# Patient Record
Sex: FEMALE | Race: WHITE
[De-identification: names, ages, dates, MRNs, and addresses within clinical notes are randomized per-mention and may not be internally consistent; named-entity substitution may affect disease eponyms.]

---

## 2017-12-21 ENCOUNTER — HOSPITAL ENCOUNTER (OUTPATIENT)
Dept: HOSPITAL 89 - OR | Age: 52
Setting detail: OBSERVATION
LOS: 1 days | Discharge: HOME | End: 2017-12-22
Attending: SURGERY | Admitting: SURGERY
Payer: COMMERCIAL

## 2017-12-21 VITALS — SYSTOLIC BLOOD PRESSURE: 113 MMHG | DIASTOLIC BLOOD PRESSURE: 66 MMHG

## 2017-12-21 VITALS — SYSTOLIC BLOOD PRESSURE: 111 MMHG | DIASTOLIC BLOOD PRESSURE: 70 MMHG

## 2017-12-21 VITALS — SYSTOLIC BLOOD PRESSURE: 143 MMHG | DIASTOLIC BLOOD PRESSURE: 81 MMHG

## 2017-12-21 VITALS — SYSTOLIC BLOOD PRESSURE: 116 MMHG | DIASTOLIC BLOOD PRESSURE: 68 MMHG

## 2017-12-21 VITALS — HEIGHT: 65 IN | BODY MASS INDEX: 31.32 KG/M2 | WEIGHT: 188 LBS

## 2017-12-21 VITALS — SYSTOLIC BLOOD PRESSURE: 119 MMHG | DIASTOLIC BLOOD PRESSURE: 67 MMHG

## 2017-12-21 VITALS — SYSTOLIC BLOOD PRESSURE: 123 MMHG | DIASTOLIC BLOOD PRESSURE: 69 MMHG

## 2017-12-21 VITALS — DIASTOLIC BLOOD PRESSURE: 62 MMHG | SYSTOLIC BLOOD PRESSURE: 114 MMHG

## 2017-12-21 VITALS — SYSTOLIC BLOOD PRESSURE: 130 MMHG | DIASTOLIC BLOOD PRESSURE: 67 MMHG

## 2017-12-21 VITALS — SYSTOLIC BLOOD PRESSURE: 112 MMHG | DIASTOLIC BLOOD PRESSURE: 64 MMHG

## 2017-12-21 VITALS — SYSTOLIC BLOOD PRESSURE: 110 MMHG | DIASTOLIC BLOOD PRESSURE: 75 MMHG

## 2017-12-21 VITALS — SYSTOLIC BLOOD PRESSURE: 110 MMHG | DIASTOLIC BLOOD PRESSURE: 67 MMHG

## 2017-12-21 VITALS — SYSTOLIC BLOOD PRESSURE: 111 MMHG | DIASTOLIC BLOOD PRESSURE: 67 MMHG

## 2017-12-21 DIAGNOSIS — N83.201: Primary | ICD-10-CM

## 2017-12-21 DIAGNOSIS — K80.80: ICD-10-CM

## 2017-12-21 DIAGNOSIS — E11.9: ICD-10-CM

## 2017-12-21 DIAGNOSIS — K66.0: ICD-10-CM

## 2017-12-21 PROCEDURE — 49652: CPT

## 2017-12-21 PROCEDURE — 36416 COLLJ CAPILLARY BLOOD SPEC: CPT

## 2017-12-21 PROCEDURE — 82948 REAGENT STRIP/BLOOD GLUCOSE: CPT

## 2017-12-21 PROCEDURE — 88302 TISSUE EXAM BY PATHOLOGIST: CPT

## 2017-12-21 PROCEDURE — 94667 MNPJ CHEST WALL 1ST: CPT

## 2017-12-21 PROCEDURE — 44970 LAPAROSCOPY APPENDECTOMY: CPT

## 2017-12-21 PROCEDURE — 47563 LAPARO CHOLECYSTECTOMY/GRAPH: CPT

## 2017-12-21 PROCEDURE — 74300 X-RAY BILE DUCTS/PANCREAS: CPT

## 2017-12-21 PROCEDURE — 88304 TISSUE EXAM BY PATHOLOGIST: CPT

## 2017-12-21 PROCEDURE — 96372 THER/PROPH/DIAG INJ SC/IM: CPT

## 2017-12-21 PROCEDURE — 58661 LAPAROSCOPY REMOVE ADNEXA: CPT

## 2017-12-21 RX ADMIN — FAMOTIDINE SCH MG: 20 TABLET, FILM COATED ORAL at 21:24

## 2017-12-21 RX ADMIN — DOCUSATE SODIUM SCH MG: 100 CAPSULE, LIQUID FILLED ORAL at 21:24

## 2017-12-21 NOTE — RADIOLOGY IMAGING REPORT
FACILITY: [facility] - [place] 

 

PATIENT NAME: Yokasta Dowd

: 1965

MR: [id number]

V: [id number]

EXAM DATE: 

ORDERING PHYSICIAN: JOHN ULLRICH

TECHNOLOGIST: 

 

Location: [facility] - [place], [place]

Patient: Yokasta Dowd

: 1965

MRN: [id number]

Visit/Account:[id number]

Date of Sevice: 2017

 

ACCESSION #: [id number]

 

Exam type: CHOLANGIOGRAM OPERATIVE

 

History: CHOLECYSTITIS

 

Comparison: CT abdomen pelvis 2017.

 

Findings:

 

Contrast is seen in the common hepatic duct, bile duct right left hepatic ducts with free flow contra
st the duodenum.  A portion of the right left hepatic ducts are obscured by overlapping surgical inst
rument.  No intraluminal filling defects were identified.  There is no evidence of biliary obstructio
n.  There is free flow of contrast in the duodenum.  The total cumulative fluoroscopy dose is 0.99139
 mGray per meter squared

 

IMPRESSION:

 

1.  As above

 

Report Dictated By: Karmen Figueroa MD at 2017 1:32 PM

 

Report E-Signed By: Karmen Figueroa MD  at 2017 1:35 PM

 

WSN:[id number]

## 2017-12-21 NOTE — POST OPERATIVE PROGRESS NOTE
Post Operative Progress Note


Date:  Dec 21, 2017


Time:  15:18


Surgeon:  


Ullrich





Dictation number:  [id number] on Fluency Mobile


Anesthesia:  


GETA by Dr. Florentino


Pre-Op Diagnosis:  


1) Right lower quadrant cystic mass


2) Symptomatic gallstones


3) Ventral hernia


Post-Op Diagnosis:  


1) Right ovarian cystic mass


2) Symptomatic Gallstones


3) Adhesions


4) Ventral Hernia


Findings:  


Right ovary was full of cysts


Normal appearing appendix


Gallstones


Normal cholangiogram


Ventral hernia containing fat, 1.5cm fascial defect


Intraabdominal adhesions


Procedure(s):  


1) Robotic appendectomy


2) Robotic right oophorectomy


3) Robotic cholecystectomy


4) Robotic adhesiolysis


5) Robotic ventral hernia repair without mesh


Specimen Removed:(May be N/A):  


1) Right ovary


2) Appendix


3) Gallbladder and contents


Complications:  


None


Fluids:  


See anesthesia record


Estimated Blood Loss:  


Minimal


Date OP Note Dictated:  Dec 21, 2017


Time OP Note Dictated:  15:22











ULLRICH,JOHN A MD Dec 21, 2017 15:35

## 2017-12-22 VITALS — DIASTOLIC BLOOD PRESSURE: 72 MMHG | SYSTOLIC BLOOD PRESSURE: 118 MMHG

## 2017-12-22 RX ADMIN — FAMOTIDINE SCH MG: 20 TABLET, FILM COATED ORAL at 09:13

## 2017-12-22 RX ADMIN — DOCUSATE SODIUM SCH MG: 100 CAPSULE, LIQUID FILLED ORAL at 09:12

## 2017-12-24 NOTE — ULLRICH LAP CHOLE
EVENT DATE:  December 21, 2017

SURGEON: John Ullrich, MD

ANESTHESIOLOGIST: Manoj Florentino MD

ANESTHESIA: General Endotracheal 



PREOPERATIVE DIAGNOSIS  

1.  Right lower quadrant cystic mass.

2.  Symptomatic gallstones. 

3.  Ventral hernia. 



POSTOPERATIVE DIAGNOSIS 

1.  Right ovarian cystic mass.

2.  Symptomatic gallstones. 

3.  Intraabdominal adhesions. 

4.  Ventral hernia. 



PROCEDURE PERFORMED 

1.  Robotic appendectomy. 

2.  Robotic right oophorectomy. 

3.  Right robotic cholecystectomy.

4.  Robotic adhesiolysis.  

5.  Robotic ventral hernia repair without mesh.



COMPLICATIONS 

None.



CONDITION

Stable.



BLOOD LOSS 

Minimal.



FINDINGS

This patient's right ovary was almost completely taken over by cysts, and there 
was one cyst that was adherent to the right abdominal wall at about the level 
of the iliac crest that I think corresponds to what was seen on CT scan.  The 
gallbladder was not acutely grossly inflamed, but there was some chronic 
inflammation.  Cholangiogram was unremarkable.  She had intraabdominal 
adhesions of omentum to the anterior abdominal wall throughout most of the 
lower abdomen, thankfully mostly on the midline and to the left of midline.  
There was a small ventral hernia in the midline several centimeters above the 
umbilicus with about 1.5 cm fascial defect, and it was containing only fat.  



SPECIMENS 

1.  Right ovary.  

2.  Appendix.  

3.  Gallbladder and contents.





INDICATIONS

This is 52-year-old female who presented to the emergency room with abdominal 
pain, and workup which included a CT scan revealed gallstones as well as a 
cystic mass in the right lower quadrant that they thought might be associated 
with the tip of the appendix, but the ovary was not clearly visualized.  I saw 
her in the office, and consented her for appendectomy, possible oophorectomy on 
the right, and cholecystectomy as well as ventral hernia repair, which was also 
seen on the CT scan.  



DESCRIPTION OF PROCEDURE 

The patient was taken into the operating room and placed supine on the 
operating table.  General endotracheal anesthesia was administered and her 
abdomen was prepped and draped in a sterile fashion.  Time-out was completed, 
and I injected the inferior umbilical skin with 0.5% ropivacaine plain and then 
made a vertical incision in the area of her previous midline infraumbilical 
incision and dissected down through the dermis and subcutaneous fat.  I 
identified the midline fascia and made a vertical incision in the midline 
fascia.  I grasped the fascial edges with Kocher clamps and then entered the 
peritoneal cavity with my finger.  I placed 2 interrupted 0-Vicryl sutures 
transversely through the vertical fascial defect and inserted the 12 mm robotic 
Shira-type port through this wound, securing it into place with the sutures.  
I insufflated the abdomen to a pressure of 15 mmHg.  I inserted the robotic 
camera in through this port and inspected the abdominal cavity 
circumferentially.  There were adhesions in the inferior abdomen from the 
umbilicus down to the pelvis in the midline and to a lesser degree in the left 
lower quadrant.  Right lower quadrant and both upper quadrants were free of 
adhesions.  Next, under direct visualization, I placed an 8 mm robotic port in 
the left mid abdomen at about the lateral clavicular line, and then another one 
midway between this one and the umbilicus.  These were placed on a horizontal 
line level with the umbilicus.  I placed another 8 mm port in the right mid 
clavicular line level with the umbilicus as well.  Next, we docked the robot 
and inserted the instruments, and I directed the robot towards the lower 
abdominal wall and started with this portion of the operation.  I identified 
the appendix, which looked grossly normal, but I made a window in the 
mesoappendix adjacent to the base of the appendix, and then divided the base of 
the appendix with an Endo ARTHUR stapler with a blue load.  I then divided the 
mesoappendix with the vessel sealer, and then the appendix was placed in the 
right upper quadrant, waiting for the rest of the specimens.  I then turned my 
attention to the ovary, which looked very cystic, and there were cysts adherent 
to the lateral abdominal wall.  I used scissors to divide the peritoneum and 
start mobilizing the structures away from the lateral abdominal wall, and I 
used the energy device and divided the vascular pedicle to the ovary as well as 
the fallopian tube, and went all the way around the lateral structures until I 
freed up the ovary and  it completely from the abdominal wall and the 
fallopian tube.  This was done in a hemostatic fashion, and there really was no 
bleeding during this portion of the case.  The ovary and distal tube was then 
brought up to the right upper quadrant with the appendix.  I then flipped the 
robot around to the upper abdomen, and then used a ProGrasp to grasp the fundus 
of the gallbladder and retract it towards the patient's right shoulder.  I used 
a Cadiere grasper and grasped the infundibulum towards the patient's right hip.
  I used the hook electrocautery and divided the peritoneum overlying the 
infundibulum and up both the medial and lateral aspects of the gallbladder.  I 
then stripped the peritoneum and subperitoneal contents down off the 
infundibulum and identified the cystic duct and arteries.  The artery was 
clipped proximally and distally and divided between clips.  I clipped the duct 
at the infundibulum cystic duct junction, made a ductotomy just distal to the 
clip.  We then inserted an angiocatheter in the right upper quadrant and 
inserted a cholangiocatheter through the angiocatheter, and then I fed it into 
the cystic duct and then clipped it into place.  We then completed cholangiogram
, which revealed that I was in the cystic duct well away from the common duct 
structures, and I identified the common hepatic duct, intrahepatic biliary 
system, common bile duct and observed for contrast flowing into the duodenum.  
There were no filling defects, and the anatomy looked normal.   I removed the 
cholangiocatheter and then clipped the duct with three clips and then  divided 
the duct between the upper two most clips nearest the gallbladder.  I then 
divided the posterior attachments to the gallbladder,  from the 
gallbladder fossa, and then all three specimens were placed in a surgical 
specimen retrieval bag and removed from the abdomen through the umbilical port 
site.  I then turned my attention to the small fascial defect in the epigastric 
midline, and I reduced all preperitoneal fat that was going through the fascial 
defect, identified the fascial edges, and then closed the fascial edges with a V
-Loc absorbable suture.  I did not use any mesh because of the dirty nature of 
the cholecystectomy and appendectomy portions of the case so as to prevent mesh 
infection.   I used a running V-Loc suture to close the fascial defect, and 
once this was completed, the fascial defect was well approximated.  I then 
removed all instruments, undocked the robot, and then removed all of the ports.
  The abdomen was desufflated, and I closed the midline fascia at the 
subumbilical incision with running 0-Vicryl suture, and then tied all three of 
these down with good reapproximation of the fascial edges.  Each skin incision 
was closed with 4-0 Monocryl subcuticular sutures.  Skin was cleaned,  dried 
and Steri-Strips were applied followed by sterile surgical dressings.  The 
patient was awakened and extubated in the operating room and transported to the 
recovery room in stable condition having tolerated the procedure without 
apparent problems.  

DALY

## 2018-02-13 ENCOUNTER — HOSPITAL ENCOUNTER (OUTPATIENT)
Dept: HOSPITAL 89 - LAB | Age: 53
End: 2018-02-13
Attending: INTERNAL MEDICINE
Payer: COMMERCIAL

## 2018-02-13 DIAGNOSIS — E11.65: Primary | ICD-10-CM

## 2018-02-13 LAB — LDLC SERPL-MCNC: 60 MG/DL

## 2018-02-13 PROCEDURE — 83718 ASSAY OF LIPOPROTEIN: CPT

## 2018-02-13 PROCEDURE — 84478 ASSAY OF TRIGLYCERIDES: CPT

## 2018-02-13 PROCEDURE — 82465 ASSAY BLD/SERUM CHOLESTEROL: CPT

## 2018-02-13 PROCEDURE — 83036 HEMOGLOBIN GLYCOSYLATED A1C: CPT

## 2018-02-13 PROCEDURE — 36415 COLL VENOUS BLD VENIPUNCTURE: CPT

## 2018-05-29 ENCOUNTER — HOSPITAL ENCOUNTER (OUTPATIENT)
Dept: HOSPITAL 89 - LAB | Age: 53
End: 2018-05-29
Attending: INTERNAL MEDICINE
Payer: COMMERCIAL

## 2018-05-29 DIAGNOSIS — E11.65: Primary | ICD-10-CM

## 2018-05-29 PROCEDURE — 83036 HEMOGLOBIN GLYCOSYLATED A1C: CPT

## 2018-05-29 PROCEDURE — 36415 COLL VENOUS BLD VENIPUNCTURE: CPT

## 2018-09-27 ENCOUNTER — HOSPITAL ENCOUNTER (OUTPATIENT)
Dept: HOSPITAL 89 - LAB | Age: 53
End: 2018-09-27
Attending: INTERNAL MEDICINE
Payer: COMMERCIAL

## 2018-09-27 DIAGNOSIS — E11.65: Primary | ICD-10-CM

## 2018-09-27 PROCEDURE — 36415 COLL VENOUS BLD VENIPUNCTURE: CPT

## 2018-09-27 PROCEDURE — 83036 HEMOGLOBIN GLYCOSYLATED A1C: CPT

## 2018-10-08 ENCOUNTER — HOSPITAL ENCOUNTER (OUTPATIENT)
Dept: HOSPITAL 89 - CT | Age: 53
End: 2018-10-08
Attending: INTERNAL MEDICINE
Payer: COMMERCIAL

## 2018-10-08 DIAGNOSIS — K44.9: Primary | ICD-10-CM

## 2018-10-08 DIAGNOSIS — Z90.49: ICD-10-CM

## 2018-10-08 DIAGNOSIS — K43.9: ICD-10-CM

## 2018-10-08 PROCEDURE — 74150 CT ABDOMEN W/O CONTRAST: CPT

## 2018-10-08 NOTE — RADIOLOGY IMAGING REPORT
FACILITY: [facility] - [place], [place] 

 

PATIENT NAME: Yokasta Dowd

: 1965

MR: [id number]

V: [id number]

EXAM DATE: 

ORDERING PHYSICIAN: JEN SAMPSON

TECHNOLOGIST: 

 

Location: [facility] - [place]

Patient: Yokasta Dowd

: 1965

MRN: [id number]

Visit/Account:[id number]

Date of Sevice: 10/08/2018

 

ACCESSION #: [id number]

 

CT ABDOMEN WITHOUT CONTRAST

 

CLINICAL INFORMATION:   Ventral hernia, pain and bulging

 

TECHNIQUE:   Axial CT images were obtained through the abdomen without administration of IV contrast.
 Reformatted coronal and sagittal images were also obtained. Dose Lowering Technique

 

One of the following dose optimization techniques was utilized in the performance of this exam: Autom
ated exposure control; adjustment of the mA and/or kV according to the patient's size; or use of an i
terative  reconstruction technique.  Specific details can be referenced in the facility's radiology C
T exam operational policy.

 

 

 

COMPARISON:   2017

 

FINDINGS:

Lower lung fields: Limited views lower lung field are unremarkable.

Evaluation of the solid organs of the abdomen is limited without IV contrast.

Liver: No focal parenchymal abnormality of the liver.

Biliary: Postsurgical changes from a cholecystectomy

Pancreas: Normal appearance.

Spleen: Normal appearance.

Adrenal glands: Unremarkable.

Kidneys / retroperitoneum: No evidence of nephrolithiasis or hydronephrosis

 

Bowel / peritoneum / mesenteries: There is a small hiatal hernia

Lymph node assessment: No pathologic adenopathy identified.

 

Vessels: No significant atherosclerotic calcification seen throughout a nonaneurysmal abdominal aorta
 and branches.

 

Musculoskeletal / Body wall: Again noted is a ventral hernia just above the level of the umbilicus.  
The hernia sac opening measures approximately 4.4 cm in diameter as opposed to 1.7 cm previously.  Th
e hernia contains edematous fat.  There are mild spondylotic changes of the visualized thoracolumbar 
spine.

 

 

IMPRESSION:

1.  There is a ventral hernia just above the umbilicus with the hernia opening measuring 4.4 centers 
in diameters opposed 1.7 cm previously.  The hernia sac contains edematous fat.

 

Small hiatal hernia

 

Postsurgical changes from a cholecystectomy

 

 

Report Dictated By: Karmen Figueroa MD at 10/8/2018 3:34 PM

 

Report E-Signed By: Karmen Figueroa MD  at 10/8/2018 3:41 PM

 

WSN:[id number]

## 2019-01-10 ENCOUNTER — HOSPITAL ENCOUNTER (OUTPATIENT)
Dept: HOSPITAL 89 - OR | Age: 54
Setting detail: OBSERVATION
LOS: 1 days | Discharge: HOME | End: 2019-01-11
Attending: SURGERY | Admitting: SURGERY
Payer: COMMERCIAL

## 2019-01-10 VITALS — DIASTOLIC BLOOD PRESSURE: 66 MMHG | SYSTOLIC BLOOD PRESSURE: 121 MMHG

## 2019-01-10 VITALS — DIASTOLIC BLOOD PRESSURE: 67 MMHG | SYSTOLIC BLOOD PRESSURE: 112 MMHG

## 2019-01-10 VITALS — SYSTOLIC BLOOD PRESSURE: 118 MMHG | DIASTOLIC BLOOD PRESSURE: 69 MMHG

## 2019-01-10 VITALS — SYSTOLIC BLOOD PRESSURE: 117 MMHG | DIASTOLIC BLOOD PRESSURE: 84 MMHG

## 2019-01-10 VITALS — SYSTOLIC BLOOD PRESSURE: 123 MMHG | DIASTOLIC BLOOD PRESSURE: 76 MMHG

## 2019-01-10 VITALS — DIASTOLIC BLOOD PRESSURE: 68 MMHG | SYSTOLIC BLOOD PRESSURE: 126 MMHG

## 2019-01-10 VITALS — SYSTOLIC BLOOD PRESSURE: 114 MMHG | DIASTOLIC BLOOD PRESSURE: 70 MMHG

## 2019-01-10 VITALS — DIASTOLIC BLOOD PRESSURE: 68 MMHG | SYSTOLIC BLOOD PRESSURE: 117 MMHG

## 2019-01-10 VITALS — DIASTOLIC BLOOD PRESSURE: 67 MMHG | SYSTOLIC BLOOD PRESSURE: 104 MMHG

## 2019-01-10 VITALS — DIASTOLIC BLOOD PRESSURE: 70 MMHG | SYSTOLIC BLOOD PRESSURE: 118 MMHG

## 2019-01-10 VITALS — SYSTOLIC BLOOD PRESSURE: 132 MMHG | DIASTOLIC BLOOD PRESSURE: 74 MMHG

## 2019-01-10 VITALS — HEIGHT: 65 IN | BODY MASS INDEX: 29.99 KG/M2 | WEIGHT: 180 LBS

## 2019-01-10 VITALS — SYSTOLIC BLOOD PRESSURE: 111 MMHG | DIASTOLIC BLOOD PRESSURE: 70 MMHG

## 2019-01-10 VITALS — DIASTOLIC BLOOD PRESSURE: 66 MMHG | SYSTOLIC BLOOD PRESSURE: 126 MMHG

## 2019-01-10 DIAGNOSIS — E78.00: ICD-10-CM

## 2019-01-10 DIAGNOSIS — K43.2: Primary | ICD-10-CM

## 2019-01-10 DIAGNOSIS — Z79.84: ICD-10-CM

## 2019-01-10 DIAGNOSIS — E11.9: ICD-10-CM

## 2019-01-10 DIAGNOSIS — K21.9: ICD-10-CM

## 2019-01-10 DIAGNOSIS — I10: ICD-10-CM

## 2019-01-10 PROCEDURE — 49652: CPT

## 2019-01-10 PROCEDURE — 36416 COLLJ CAPILLARY BLOOD SPEC: CPT

## 2019-01-10 PROCEDURE — 82948 REAGENT STRIP/BLOOD GLUCOSE: CPT

## 2019-01-10 PROCEDURE — 96372 THER/PROPH/DIAG INJ SC/IM: CPT

## 2019-01-10 RX ADMIN — INSULIN LISPRO PRN UNIT: 100 INJECTION, SOLUTION INTRAVENOUS; SUBCUTANEOUS at 21:09

## 2019-01-10 RX ADMIN — SODIUM CHLORIDE SCH MLS/HR: 900 IRRIGANT IRRIGATION at 19:25

## 2019-01-10 RX ADMIN — DOCUSATE SODIUM SCH MG: 100 CAPSULE, LIQUID FILLED ORAL at 21:10

## 2019-01-10 RX ADMIN — FAMOTIDINE SCH MG: 20 TABLET, FILM COATED ORAL at 21:10

## 2019-01-10 RX ADMIN — INSULIN LISPRO PRN UNIT: 100 INJECTION, SOLUTION INTRAVENOUS; SUBCUTANEOUS at 17:26

## 2019-01-10 RX ADMIN — SODIUM CHLORIDE SCH MLS/HR: 900 IRRIGANT IRRIGATION at 14:45

## 2019-01-10 NOTE — NUR
per patients on glucometer device

-------------------------------------------------------------------------------

Addendum: 01/10/19 at 2142 by TEODORA STAPLETON RN

-------------------------------------------------------------------------------

Amended: Links added.

## 2019-01-10 NOTE — POST OPERATIVE PROGRESS NOTE
Post Operative Progress Note


Date:  Augustus 10, 2019


Time:  10:03


Surgeon:  


Ullrich





Dictation number:  [id number]


Anesthesia:  


GETA by Dr. Stallings


Pre-Op Diagnosis:  


Recurrent ventral/incisional hernia


Post-Op Diagnosis:  


YOSVANY


Findings:  


C/W dx, 4x3cm fascial defect in epigastric midline


Procedure(s):  


Robotic ventral hernia repair with 8x12cm Ventrio mesh


Specimen Removed:(May be N/A):  


None


Complications:  


None


Fluids:  


See anesthsia record


Estimated Blood Loss:  


Minimal


Date OP Note Dictated:  Augustus 10, 2019


Time OP Note Dictated:  10:05











ULLRICH,JOHN A MD              Augustus 10, 2019 10:12

## 2019-01-10 NOTE — OPERATIVE REPORT 1
EVENT DATE: January 10, 2019 

SURGEON: John Ullrich, MD 

ANESTHESIOLOGIST: Bubba Stallings MD 

ANESTHESIA: General endotracheal anesthesia. 





PREOPERATIVE DIAGNOSIS  

Recurring ventral/incisional hernia. 



POSTOPERATIVE DIAGNOSIS 

Recurring ventral/incisional hernia. 



PROCEDURE PERFORMED 

Robotic ventral hernia repair with mesh, 8 x 12 cm piece of Ventral mesh used to

plug the defect.  



COMPLICATIONS 

None. 



CONDITION 

Stable.  



ESTIMATED BLOOD LOSS

Minimal. 



FINDINGS 

The patient had a 3 x 4 cm fascial defect in the epigastric midline above the 

umbilicus.  



INDICATIONS

This is a 53-year-old female who presented to my office with a hernia in her 

epigastric midline just above her umbilicus in an area of a previous laparotomy 

incision.  I actually repaired it without mesh last year when I was doing 

another surgery involving an appendectomy, cholecystectomy, and right 

oophorectomy, but because of the nature of that surgery, I could not use mesh 

and so told her about the risk of recurrence and it has since recurred and she 

is requesting to have it repaired.  



DESCRIPTION OF PROCEDURE 

The patient was brought to the operating room and placed supine on the operating

table.  General endotracheal anesthesia was administered and her abdomen was 

prepped and draped in sterile fashion.  A timeout was completed and I injected 

the right subcostal skin with 0.5% Ropivacaine plain and made an 8 mm transverse

incision in the right subcostal skin in about the mid clavicular line.  I then 

used the Veress needle and accessed the abdominal cavity and insufflated the 

abdominal cavity to a pressure of 15 mmHg without any problems.  I then used an 

8 mm Robotic port with the optical trocar and the camera in and focused and 

inserted the trocar in the insufflated abdomen under direct visualization 

without any problems.  I then under direct visualization placed a 12 mm robotic 

trocar in the right mid-abdomen, somewhat lateral about the anterior axillary 

line and then an 8 mm robotic port in the right lower quadrant.  I then brought 

the robot in, docked and targeted the robot, and then scrubbed out and went to 

the console and then reduced the hernia which was easily reduced back into the 

abdomen and then stripped the peritoneum down on all sides of the fascial defect

including the falciform ligament and there were also some adhesions that I had 

to take down.  After cleaning the fascial defect circumferentially and stripping

the peritoneum down off of the abdominal wall on all sides of the fascial defect

to make room for the mesh, I put the ruler inside the abdomen and measured the 

fascial defect and it was 4 cm in the transverse direction and 3 cm in the 

vertical direction.  I then closed the fascial defect with a running absorbable 

V-Loc suture and then placed an 8 x 12 cm piece of Ventral mesh in the abdominal

cavity and I placed a 2-0 Prolene suture right in the middle of the mesh on the 

Prolene side and then used a Mumtaz-Alma to pull the mesh up against the 

abdominal wall.  I then sewed the mesh all the way circumferentially with 

absorbable V-Loc sutures and then ran one time down the middle to make sure this

was adherent to the abdominal wall.  I then made sure I removed all of any 

suture fragments and needles from the abdominal cavity and then the robot was 

undocked after all the instruments were removed and then I used the Mumtaz-

Alma to close the 12 mm fascia with a single 0 Vicryl suture.  I did this 

under direct visualization and noticed good reapproximation of the fascial edges

when this was completed.  I then closed the skin in each port site with 4-0 

Monocryl subcuticular suture and the stab incision in the midline to pass the 

Mumtaz-Alma through was closed with a single 3-0 Chromic suture.  I then 

cleaned and dried the skin and placed Steri-Strips over each of the incisions 

followed by sterile surgical dressings.  The patient was awakened and extubated 

in the operating room and transferred to the recovery room in stable condition 

having tolerated the procedure without any apparent problems. 

DALY

## 2019-01-11 VITALS — SYSTOLIC BLOOD PRESSURE: 125 MMHG | DIASTOLIC BLOOD PRESSURE: 71 MMHG

## 2019-01-11 VITALS — DIASTOLIC BLOOD PRESSURE: 61 MMHG | SYSTOLIC BLOOD PRESSURE: 113 MMHG

## 2019-01-11 RX ADMIN — FAMOTIDINE SCH MG: 20 TABLET, FILM COATED ORAL at 09:43

## 2019-01-11 RX ADMIN — DOCUSATE SODIUM SCH MG: 100 CAPSULE, LIQUID FILLED ORAL at 09:43

## 2019-01-11 RX ADMIN — SODIUM CHLORIDE SCH MLS/HR: 900 IRRIGANT IRRIGATION at 08:00

## 2019-01-11 RX ADMIN — SODIUM CHLORIDE SCH MLS/HR: 900 IRRIGANT IRRIGATION at 01:26

## 2019-01-11 NOTE — SHORT(OUTPT) DISCHARGE SUMMARY
Discharge Summary


Reason for Hosp/Final Diag:  


(1) S/P recurrent ventral herniorrhaphy


Status:  Acute


Hospital Course & Plan:  Robotic VH repair completed without problems.  Pt with 


some postop nausea but now better this morning.  No other issues overnight.  


Will d/c to home today.





Departure


Discharge to:  Home, Self Care





Discharge Instructions


Home Meds


Active Scripts


Oxycodone/Acetaminophen (OXYCODONE/ACETAMINOPHEN 5MG/325 MG) 5 Mg/325 Mg Tab, 1 


TAB PO Q4H PRN for MODERATE PAIN, #30 TAB 0 Refills


   Prov:ULLRICH,JOHN A MD         1/11/19


Docusate Sodium (DOCUSATE SODIUM) 100 Mg Capsule, 1 CAP PO BID, #30 CAPSULE 0 


Refills


   Prov:ULLRICH,JOHN A MD         1/11/19


Lisinopril (LISINOPRIL) 20 Mg Tablet, 1 TAB PO QDAY, #90 TAB 3 Refills


   Prov:JEN SAMPSON MD         1/2/19


Needles, Insulin Disposable (PEN NEEDLES) 1 Each Dis.needle, EA MC DAILY, #3 3 


Refills


   Prov:JEN SAMPSON MD         11/1/18


Empagliflozin (Jardiance) 10 Mg Tablet, 1 TAB PO DAILY, #90 TAB 3 Refills


   Prov:JEN SAMPSON MD         10/29/18


Flash Glucose Sensor (Freestyle Kody Sensor) 1 Each Kit, EACH SUBQ DAILY, #1


   Prov:JEN SAMPSON MD         10/3/18


Flash Glucose Scanning [place] (Freestyle Kody [place]) 1 Each Each, EACH 


ASDIRECTED, #1


   Prov:JEN SAMPSON MD         10/3/18


Fluticasone Prop 50 Mcg Ns (FLONASE 50 MCG NS) 16 Gm Spray.susp, 2 SPRAYS NS 


QDAY, #1 BOT 3 Refills


   Prov:JEN SAMPSON MD         10/2/18


Dulaglutide (Trulicity) 1.5 Mg/0.5 Ml Pen.injctr, 1.5 MG SQ weekly, #4 MISC 11 


Refills


   Prov:JEN SAMPSON MD         8/8/18


Rosuvastatin Calcium (CRESTOR) 40 Mg Tablet, 1 TAB PO QDAY, #90 TAB 3 Refills


   Prov:JEN SAMPSON MD         3/21/18


Insulin Lispro 100 Un/Ml Pen (HUMALOG 3 ML PEN) 100 Unit/1 Ml Insuln.pen, 38 


UNIT SQ DAILY, #1 BOX 11 Refills


   10 units subq before breakfast. 28 units subq before dinner.


   Prov:JEN SAMPSON MD         3/19/18


Insulin Degludec (Tresiba Flextouch U-100) 100 Unit/Ml (3 Ml) Insuln.pen, 96 


UNITS SUBQ QAM, #2 BOX 11 Refills


   Prov:JEN SAMPSON MD         3/5/18


Metformin Hcl (METFORMIN HCL) 1,000 Mg Tablet, 1 TAB PO BID, #180 TAB 3 Refills


   Prov:JEN SAMPSON MD         12/12/17


Hydrochlorothiazide (HYDROCHLOROTHIAZIDE) 12.5 Mg Tablet, 1 TAB PO QDAY, #90 TAB


4 Refills


   Prov:JEN SAMPSON MD         11/30/17


Reported Medications


Cholecalciferol (Vitamin D3) (VITAMIN D-3) 2,000 Unit Capsule, 2000 UNIT PO 


QDAY, CAPSULE


   3/14/17


Aspirin (Aspirin) 81 Mg Tablet., 1 TAB PO QDAY, 0 Refills


   2/4/12


Follow up Referrals:  


General Surgery - 01/22/19 @ Surgery, General with ULLRICH,JOHN A MD You have a 


follow up appointment scheduled with Dr. Ullrich on Tuesday, 1/22/19, at 4:00pm.





Diet:  Diabetic


Activity:  No Heavy Lifting


Special Instructions:  


You may remove the white surgical dressings in Saturday, 1/12/19, then you


can shower.  After showering, leave the incisions open to air but leave


the steristrips in place until they fall off on their own.  Do not immerse


the incisions for 2 weeks.  Avoid any activity that uses your abdominal


muscles or involves straining or lifting more than 10 pounds for 6 weeks


after surgery.











ULLRICH,JOHN A MD              Jan 11, 2019 07:19

## 2019-01-25 RX ADMIN — VANCOMYCIN HYDROCHLORIDE ONE MLS/HR: 1 INJECTION, POWDER, LYOPHILIZED, FOR SOLUTION INTRAVENOUS at 13:00

## 2019-01-25 RX ADMIN — PIPERACILLIN AND TAZOBACTAM ONE MLS/HR: 4; .5 INJECTION, POWDER, LYOPHILIZED, FOR SOLUTION INTRAVENOUS; PARENTERAL at 13:00

## 2019-01-26 ENCOUNTER — HOSPITAL ENCOUNTER (EMERGENCY)
Dept: HOSPITAL 89 - ER | Age: 54
Discharge: ANOTHER HOSPITAL | End: 2019-01-26
Payer: COMMERCIAL

## 2019-01-26 ENCOUNTER — HOSPITAL ENCOUNTER (OUTPATIENT)
Dept: HOSPITAL 89 - AMB | Age: 54
End: 2019-01-26

## 2019-01-26 ENCOUNTER — HOSPITAL ENCOUNTER (OUTPATIENT)
Dept: HOSPITAL 89 - AMB | Age: 54
End: 2019-01-26
Payer: COMMERCIAL

## 2019-01-26 VITALS — DIASTOLIC BLOOD PRESSURE: 36 MMHG | SYSTOLIC BLOOD PRESSURE: 81 MMHG

## 2019-01-26 DIAGNOSIS — E87.2: ICD-10-CM

## 2019-01-26 DIAGNOSIS — J96.02: ICD-10-CM

## 2019-01-26 DIAGNOSIS — A41.9: ICD-10-CM

## 2019-01-26 DIAGNOSIS — J96.01: ICD-10-CM

## 2019-01-26 DIAGNOSIS — R06.82: ICD-10-CM

## 2019-01-26 DIAGNOSIS — R56.9: Primary | ICD-10-CM

## 2019-01-26 DIAGNOSIS — D68.9: ICD-10-CM

## 2019-01-26 DIAGNOSIS — R65.21: ICD-10-CM

## 2019-01-26 DIAGNOSIS — R09.02: ICD-10-CM

## 2019-01-26 DIAGNOSIS — R00.0: ICD-10-CM

## 2019-01-26 DIAGNOSIS — T68.XXXA: ICD-10-CM

## 2019-01-26 DIAGNOSIS — E27.49: Primary | ICD-10-CM

## 2019-01-26 DIAGNOSIS — Z02.9: Primary | ICD-10-CM

## 2019-01-26 DIAGNOSIS — R11.2: ICD-10-CM

## 2019-01-26 DIAGNOSIS — I46.9: ICD-10-CM

## 2019-01-26 LAB
INR PPP: (no result)
PLATELET COUNT, AUTOMATED: 101 K/UL (ref 150–450)
PLATELET COUNT, AUTOMATED: 146 K/UL (ref 150–450)
PLATELET COUNT, AUTOMATED: 259 K/UL (ref 150–450)
PLATELET COUNT, AUTOMATED: 78 K/UL (ref 150–450)

## 2019-01-26 PROCEDURE — 93005 ELECTROCARDIOGRAM TRACING: CPT

## 2019-01-26 PROCEDURE — 85379 FIBRIN DEGRADATION QUANT: CPT

## 2019-01-26 PROCEDURE — 84520 ASSAY OF UREA NITROGEN: CPT

## 2019-01-26 PROCEDURE — 86920 COMPATIBILITY TEST SPIN: CPT

## 2019-01-26 PROCEDURE — 96365 THER/PROPH/DIAG IV INF INIT: CPT

## 2019-01-26 PROCEDURE — 86850 RBC ANTIBODY SCREEN: CPT

## 2019-01-26 PROCEDURE — 84484 ASSAY OF TROPONIN QUANT: CPT

## 2019-01-26 PROCEDURE — 84075 ASSAY ALKALINE PHOSPHATASE: CPT

## 2019-01-26 PROCEDURE — 82374 ASSAY BLOOD CARBON DIOXIDE: CPT

## 2019-01-26 PROCEDURE — 74176 CT ABD & PELVIS W/O CONTRAST: CPT

## 2019-01-26 PROCEDURE — 36416 COLLJ CAPILLARY BLOOD SPEC: CPT

## 2019-01-26 PROCEDURE — 85025 COMPLETE CBC W/AUTO DIFF WBC: CPT

## 2019-01-26 PROCEDURE — 99285 EMERGENCY DEPT VISIT HI MDM: CPT

## 2019-01-26 PROCEDURE — 84450 TRANSFERASE (AST) (SGOT): CPT

## 2019-01-26 PROCEDURE — 71250 CT THORAX DX C-: CPT

## 2019-01-26 PROCEDURE — 96375 TX/PRO/DX INJ NEW DRUG ADDON: CPT

## 2019-01-26 PROCEDURE — 86900 BLOOD TYPING SEROLOGIC ABO: CPT

## 2019-01-26 PROCEDURE — 36600 WITHDRAWAL OF ARTERIAL BLOOD: CPT

## 2019-01-26 PROCEDURE — 36556 INSERT NON-TUNNEL CV CATH: CPT

## 2019-01-26 PROCEDURE — 82274 ASSAY TEST FOR BLOOD FECAL: CPT

## 2019-01-26 PROCEDURE — 96368 THER/DIAG CONCURRENT INF: CPT

## 2019-01-26 PROCEDURE — 82948 REAGENT STRIP/BLOOD GLUCOSE: CPT

## 2019-01-26 PROCEDURE — 84295 ASSAY OF SERUM SODIUM: CPT

## 2019-01-26 PROCEDURE — 86901 BLOOD TYPING SEROLOGIC RH(D): CPT

## 2019-01-26 PROCEDURE — 84155 ASSAY OF PROTEIN SERUM: CPT

## 2019-01-26 PROCEDURE — 82947 ASSAY GLUCOSE BLOOD QUANT: CPT

## 2019-01-26 PROCEDURE — 82330 ASSAY OF CALCIUM: CPT

## 2019-01-26 PROCEDURE — 87040 BLOOD CULTURE FOR BACTERIA: CPT

## 2019-01-26 PROCEDURE — 94002 VENT MGMT INPAT INIT DAY: CPT

## 2019-01-26 PROCEDURE — 82803 BLOOD GASES ANY COMBINATION: CPT

## 2019-01-26 PROCEDURE — 85730 THROMBOPLASTIN TIME PARTIAL: CPT

## 2019-01-26 PROCEDURE — 83605 ASSAY OF LACTIC ACID: CPT

## 2019-01-26 PROCEDURE — 85610 PROTHROMBIN TIME: CPT

## 2019-01-26 PROCEDURE — 82310 ASSAY OF CALCIUM: CPT

## 2019-01-26 PROCEDURE — 71045 X-RAY EXAM CHEST 1 VIEW: CPT

## 2019-01-26 PROCEDURE — 82435 ASSAY OF BLOOD CHLORIDE: CPT

## 2019-01-26 PROCEDURE — 82247 BILIRUBIN TOTAL: CPT

## 2019-01-26 PROCEDURE — 82565 ASSAY OF CREATININE: CPT

## 2019-01-26 PROCEDURE — 31500 INSERT EMERGENCY AIRWAY: CPT

## 2019-01-26 PROCEDURE — 82040 ASSAY OF SERUM ALBUMIN: CPT

## 2019-01-26 PROCEDURE — 84460 ALANINE AMINO (ALT) (SGPT): CPT

## 2019-01-26 PROCEDURE — 84132 ASSAY OF SERUM POTASSIUM: CPT

## 2019-01-26 RX ADMIN — PIPERACILLIN AND TAZOBACTAM ONE MLS/HR: 4; .5 INJECTION, POWDER, LYOPHILIZED, FOR SOLUTION INTRAVENOUS; PARENTERAL at 13:00

## 2019-01-26 RX ADMIN — VANCOMYCIN HYDROCHLORIDE ONE MLS/HR: 1 INJECTION, POWDER, LYOPHILIZED, FOR SOLUTION INTRAVENOUS at 13:00

## 2019-01-26 NOTE — GENERAL SURGERY CONSULTATION
History of Present Illness


Requesting Physician


Dr. Gunderson


Reason for Consult


Shock, s/p cardiac arrest


Chief Complaint


near syncope


History of Present Illness


This 53 year old female presented to [facility] with history of near syncope.  she was 


hypotensive and rapidly deteriorated with cardiac arrest requiring CPR x 5.  


Upon my arrival patient had just returned from CT and was on maximal 


vasopressors.  She was markedly acidotic, hypothermic, and coagulopathic.  


Review of CT scan with radiology was remarkable for left adrenal hemorrhage, 


sternal fracture and small amount of perihepatic fluid likely related to the 


mechanical CPR..  Patient is s/p robotic ventral incisional hernia repair 15 


days ago.  there is fluid in the hernia sac as would be expected. Blood cultures


had been sent, Vancomycin and Zosyn administered.





History


Unable To Obtain Past Medical:  Unable to Obtain/Update


Home Meds


Active Scripts


Oxycodone/Acetaminophen (OXYCODONE/ACETAMINOPHEN 5MG/325 MG) 5 Mg/325 Mg Tab, 1 


TAB PO Q4H PRN for MODERATE PAIN, #30 TAB 0 Refills


   Prov:ULLRICH,JOHN A MD         1/11/19


Docusate Sodium (DOCUSATE SODIUM) 100 Mg Capsule, 1 CAP PO BID, #30 CAPSULE 0 


Refills


   Prov:ULLRICH,JOHN A MD         1/11/19


Lisinopril (LISINOPRIL) 20 Mg Tablet, 1 TAB PO QDAY, #90 TAB 3 Refills


   Prov:JEN SAMPSON MD         1/2/19


Needles, Insulin Disposable (PEN NEEDLES) 1 Each Dis.needle, EA MC DAILY, #3 3 


Refills


   Prov:JEN SAMPSON MD         11/1/18


Empagliflozin (Jardiance) 10 Mg Tablet, 1 TAB PO DAILY, #90 TAB 3 Refills


   Prov:JEN SAMPSON MD         10/29/18


Flash Glucose Sensor (Freestyle Kody Sensor) 1 Each Kit, EACH SUBQ DAILY, #1


   Prov:JEN SAMPSON MD         10/3/18


Flash Glucose Scanning Hamilton (Freestyle Kody Hamilton) 1 Each Each, EACH 


ASDIRECTED, #1


   Prov:JEN SAMPSON MD         10/3/18


Fluticasone Prop 50 Mcg Ns (FLONASE 50 MCG NS) 16 Gm Spray.susp, 2 SPRAYS NS 


QDAY, #1 BOT 3 Refills


   Prov:JEN SAMPSON MD         10/2/18


Dulaglutide (Trulicity) 1.5 Mg/0.5 Ml Pen.injctr, 1.5 MG SQ weekly, #4 MISC 11 


Refills


   Prov:JEN SAMPSON MD         8/8/18


Rosuvastatin Calcium (CRESTOR) 40 Mg Tablet, 1 TAB PO QDAY, #90 TAB 3 Refills


   Prov:JEN SAMPSON MD         3/21/18


Insulin Lispro 100 Un/Ml Pen (HUMALOG 3 ML PEN) 100 Unit/1 Ml Insuln.pen, 38 


UNIT SQ DAILY, #1 BOX 11 Refills


   10 units subq before breakfast. 28 units subq before dinner.


   Prov:JEN SAMPSON MD         3/19/18


Insulin Degludec (Tresiba Flextouch U-100) 100 Unit/Ml (3 Ml) Insuln.pen, 96 


UNITS SUBQ QAM, #2 BOX 11 Refills


   Prov:JEN SAMPSON MD         3/5/18


Metformin Hcl (METFORMIN HCL) 1,000 Mg Tablet, 1 TAB PO BID, #180 TAB 3 Refills


   Prov:JEN SAMPSON MD         12/12/17


Hydrochlorothiazide (HYDROCHLOROTHIAZIDE) 12.5 Mg Tablet, 1 TAB PO QDAY, #90 TAB


4 Refills


   Prov:JEN SAMPSON MD         11/30/17


Reported Medications


Cholecalciferol (Vitamin D3) (VITAMIN D-3) 2,000 Unit Capsule, 2000 UNIT PO 


QDAY, CAPSULE


   3/14/17


Aspirin (Aspirin) 81 Mg Tablet.dr, 1 TAB PO QDAY, 0 Refills


   2/4/12


Allergies:  


Coded Allergies:  


     No Known Drug Allergies (Verified , 12/27/18)


Family History:  


FH: HTN (hypertension)


  MOTHER


  BROTHER OR SISTER


FH: diabetes mellitus


  MOTHER


FH: hypercholesterolemia


  BROTHER OR SISTER


FHx: heart failure


  BROTHER OR SISTER





Exam


Vital Signs





Vital Signs








  Date Time  Temp Pulse Resp B/P (MAP) Pulse Ox O2 Delivery O2 Flow Rate FiO2


 


1/26/19 16:00        100.0


 


1/26/19 15:10  102 34     


 


1/26/19 15:09    110/89 (96)    


 


1/26/19 14:55     55   


 


1/26/19 11:55       10.0 


 


1/26/19 11:44 97.4     Non-Rebreather  








General Appearance:  Other (on ventilator)


Neuro:  Other (does follow simple commands, moving all 4 extremities)


Cardiovascular:  Other (sinus tachycardia)


Chest:  Other (contusion abrasion over the lower sternum)


GI:  Other (distended, soft, quiet, incisions all healing without erythema or 


warmth.)


Extremities:  Other (hematomas and oozing from IV sites and needle sticks)


Integumentary:  Cyanosis





Medical Decision Making


Data Points


Result Diagram:  


1/26/19 1427                                                                    


           1/26/19 1530








Assessment and Plan


Problems:  


(1) Acidosis, metabolic


Status:  Acute


Assessment & Plan:  Secondary to shock state and s/p caridac arrest x 5.


Na HCO3 to raise pH over 7.1





(2) Shock


Status:  Acute


Assessment & Plan:  Septic shock,  etiology unclear though likely related to 


pneumonia versus Urosepsis.  Did have a recent E Coli UTI


No evidence this is hemorrhagic shock based on CT scans, Good contractility on 


FAST so dont think this is cardiogenic.





(3) Acute respiratory failure with hypoxia and hypercarbia


Status:  Acute


(4) Hypothermia


Status:  Acute


Assessment & Plan:  Rewarming with Red Hugger





(5) Coagulopathy


Assessment & Plan:  Likely multifactorial s/p arrest, dilutional (patient had 


received over 7000 ml crystalloid)


Will give FFP 4 units now.





(6) Cardiac arrest with successful resuscitation


Status:  Acute


Assessment & Plan:  Following commands


Sternal fracture and blund trauma related to mechanical CPR device.





(7) Septic shock


Status:  Acute


Assessment & Plan:  Etiology unclear though does have infiltrate in lung bases 


on CT and recent hx of E Coli UTI.


Has received Vancomycin and Zosyn.


Did receive Stress Steroid dose due to finding of adrenal hemorrhage.





(8) Adrenal hemorrhage


Status:  Acute


Assessment & Plan:  unclear if related to sepsis or traumatic from CPR.


Did receive stress dose steroids.





Central Venous Access


Medical Necessity for Access:  Hemodynamic Monitoring, IV Access, Medication 


Administration


Condition


Critical condition.  Dr. Gunderson has arranged for emergent transfer to [facility].


Critical Time Spent:  1st 30-74 Minutes





Venous Thromboembolism


VTE Risk


Patient's VTE Risk:  High





VTE Diagnostic Test


2 Days Prior to Admit:  No





Antithrombotics


Is Pt On Any Antithrombotics?:  No





Prophylaxis Tx Contraindicated


Pharmacological Contraindicati:  Low Platelet Count





Problem Qualifiers





(1) Hypothermia:  


Encounter type:  initial encounter  Qualified Codes:  T68.XXXA - Hypothermia, 


initial encounter








KOSTAS GAN MD               Jan 26, 2019 17:07

## 2019-01-26 NOTE — PROCEDURE NOTE
ART Line Procedure Note


Consent Signed:  No


ART Line Indication:  Hemodynamic Monitoring


ART Line Lumen:  Single


Line Procedure:  Chlorhexidine Prep, Sterile Drapes Applied, Sterile Dressing 


Applied


Line Position:  L Femoral


Complications:  None


Line Post Position:  Sutured, Confirmed Blood Return











KOSTAS GAN MD               Jan 26, 2019 17:09

## 2019-01-26 NOTE — RADIOLOGY IMAGING REPORT
FACILITY: [facility] 

 

PATIENT NAME: Yokasta Dowd

: 1965

MR: [id number]

V: [id number]

EXAM DATE: 

ORDERING PHYSICIAN: KENN PARADA

TECHNOLOGIST: 

 

Location: [facility]

Patient: Yokasta Dowd

: 1965

MRN: [id number]

Visit/Account:[id number]

Date of Sevice:  2019

 

ACCESSION #: [id number]

 

CHEST SINGLE AP

 

Additional pertinent History:   Chest pain

 

COMPARISON STUDIES:  none

 

FINDINGS:

Support lines and catheters: EKG wire leads. Tracheostomy cannula seen 4 cm from the iron.

 

Lungs and Pleura:  Diminished lung excursion and apical lordotic angle foreshortening chest. There ar
e bibasilar areas of atelectasis.

 

Heart and vasculature:  Hearts grossly normal.

 

Marla and Mediastinum:  Widened mediastinum. Aortic arch is obscured.

 

Bones and Chest wall:  Negative.

 

Upper Abdomen:  Negative.

 

IMPRESSION:

1. Bilateral basilar atelectasis.

2. Widening the mediastinum. This could be technical but mediastinal pathology is of a concern.. Ran
mmend CT scan of the chest for complete evaluation.

 

Report Dictated By: Reed Desai MD at 2019 2:07 PM

 

Report E-Signed By: Reed Desai MD  at 2019 2:14 PM

 

WSN:[id number]

## 2019-01-26 NOTE — ER REPORT
History and Physical


Time Seen By MD:  11:50


HPI/ROS


54 y/o female with a history of insulin dependent diabetes and HTN presents to 


the ED with diffuse abdominal pain and presyncope at home. [place] nauseous when 


she woke up this morning, but otherwise felt at her baseline. AT [employer] 


for breakfast. Denies vomiting or diarrhea. No melena. Had hernia repair Augustus. 


10, and has otherwise been well. Denies chest pain or SOB. No fever/chills. Anjali


kuldeep for a UTI approximately one month ago. No HA or meningismus. No seizure 


activity as stated in the chief complaint. History was consistent with pre-


syncope and not seizure.


Remainder of the 14 system rev:  Yes


Allergies:  


Coded Allergies:  


     No Known Drug Allergies (Verified , 12/27/18)


Home Meds


Active Scripts


Oxycodone/Acetaminophen (OXYCODONE/ACETAMINOPHEN 5MG/325 MG) 5 Mg/325 Mg Tab, 1 


TAB PO Q4H PRN for MODERATE PAIN, #30 TAB 0 Refills


   Prov:ULLRICH,JOHN A MD         1/11/19


Docusate Sodium (DOCUSATE SODIUM) 100 Mg Capsule, 1 CAP PO BID, #30 CAPSULE 0 


Refills


   Prov:ULLRICH,JOHN A MD         1/11/19


Lisinopril (LISINOPRIL) 20 Mg Tablet, 1 TAB PO QDAY, #90 TAB 3 Refills


   Prov:JEN SAMPSON MD         1/2/19


Needles, Insulin Disposable (PEN NEEDLES) 1 Each Dis.needle, EA MC DAILY, #3 3 


Refills


   Prov:JEN SAMPSON MD         11/1/18


Empagliflozin (Jardiance) 10 Mg Tablet, 1 TAB PO DAILY, #90 TAB 3 Refills


   Prov:JEN SAMPSON MD         10/29/18


Flash Glucose Sensor (Freestyle Kody Sensor) 1 Each Kit, EACH SUBQ DAILY, #1


   Prov:JEN SAMPSON MD         10/3/18


Flash Glucose Scanning [place] (Freestyle Kody [place]) 1 Each Each, EACH 


ASDIRECTED, #1


   Prov:JEN SAMPSON MD         10/3/18


Fluticasone Prop 50 Mcg Ns (FLONASE 50 MCG NS) 16 Gm Spray.susp, 2 SPRAYS NS 


QDAY, #1 BOT 3 Refills


   Prov:JEN SAMPSON MD         10/2/18


Dulaglutide (Trulicity) 1.5 Mg/0.5 Ml Pen.injctr, 1.5 MG SQ weekly, #4 MISC 11 


Refills


   Prov:JEN SAMPSON MD         8/8/18


Rosuvastatin Calcium (CRESTOR) 40 Mg Tablet, 1 TAB PO QDAY, #90 TAB 3 Refills


   Prov:JEN SAMPSON MD         3/21/18


Insulin Lispro 100 Un/Ml Pen (HUMALOG 3 ML PEN) 100 Unit/1 Ml Insuln.pen, 38 


UNIT SQ DAILY, #1 BOX 11 Refills


   10 units subq before breakfast. 28 units subq before dinner.


   Prov:JEN SAMPSON MD         3/19/18


Insulin Degludec (Tresiba Flextouch U-100) 100 Unit/Ml (3 Ml) Insuln.pen, 96 


UNITS SUBQ QAM, #2 BOX 11 Refills


   Prov:JEN SAMPSON MD         3/5/18


Metformin Hcl (METFORMIN HCL) 1,000 Mg Tablet, 1 TAB PO BID, #180 TAB 3 Refills


   Prov:JEN SAMPSON MD         12/12/17


Hydrochlorothiazide (HYDROCHLOROTHIAZIDE) 12.5 Mg Tablet, 1 TAB PO QDAY, #90 TAB


4 Refills


   Prov:JEN SAMPSON MD         11/30/17


Reported Medications


Cholecalciferol (Vitamin D3) (VITAMIN D-3) 2,000 Unit Capsule, 2000 UNIT PO 


QDAY, CAPSULE


   3/14/17


Aspirin (Aspirin) 81 Mg Tablet.dr, 1 TAB PO QDAY, 0 Refills


   2/4/12


Reviewed Nurses Notes:  Yes


Old Medical Records Reviewed:  Yes


Hx Smoking:  No


Smoking Status:  Never Smoker


Exposure to Second Hand Smoke?:  Yes (CHILDHOOD AND CURRENTLY)


Hx Substance Use Disorder:  No


Hx Alcohol Use:  No


Constitutional





Vital Sign - Last 24 Hours








 1/26/19 1/26/19 1/26/19 1/26/19





 11:44 11:49 11:52 11:55


 


Temp 97.4   


 


Pulse 117   


 


Resp 18   


 


B/P (MAP) 78/53 95/20 (45) 128/87 (101) 


 


Pulse Ox 85   


 


O2 Delivery Non-Rebreather   


 


O2 Flow Rate    10.0


 


    





 1/26/19 1/26/19 1/26/19 1/26/19





 12:00 12:02 12:12 12:13


 


Resp    25


 


B/P (MAP) 78/53 (61) 82/66 (71) 89/62 (71) 


 


Pulse Ox    74





 1/26/19 1/26/19 1/26/19 1/26/19





 12:15 12:18 12:23 12:24


 


Pulse  118 118 


 


Resp  13 24 


 


B/P (MAP) 93/63 (73)   114/91 (99)





 1/26/19 1/26/19 1/26/19 1/26/19





 12:26 12:28 12:30 12:32


 


Pulse  115  


 


Resp  19  


 


B/P (MAP) 98/49 (65) 118/69 (85) 84/50 (61) 77/56 (63)


 


Pulse Ox  86  





 1/26/19 1/26/19 1/26/19 1/26/19





 12:33 12:34 12:36 12:38


 


Pulse 114   118


 


Resp 22   32


 


B/P (MAP)  79/50 (60) 45/24 (31) 61/--- (34)


 


Pulse Ox 94   92





 1/26/19 1/26/19 1/26/19 1/26/19





 12:41 12:43 12:44 12:45


 


Pulse  108  


 


Resp  31  


 


B/P (MAP) 45/24 (31)  39/23 (28) 49/25 (33)


 


Pulse Ox  83  





 1/26/19 1/26/19 1/26/19 1/26/19





 12:48 12:49 12:51 12:53


 


Pulse 112   115


 


Resp 38   32


 


B/P (MAP) 44/24 (31) 40/27 (31) 61/21 (34) 60/26 (37)


 


Pulse Ox 82   85





 1/26/19 1/26/19 1/26/19 1/26/19





 12:56 12:58 13:00 13:02


 


Pulse  114  


 


Resp  30  


 


B/P (MAP) 81/25 (43) 91/22 (45) 72/25 (41) 70/--- (496)


 


Pulse Ox  87  





 1/26/19 1/26/19 1/26/19 1/26/19





 13:03 13:04 13:06 13:08


 


Pulse 115   115


 


Resp 27   35


 


B/P (MAP)  51/27 (35) 104/76 (85) 61/28 (39)


 


Pulse Ox 70   86





 1/26/19 1/26/19 1/26/19 1/26/19





 13:10 13:13 13:14 13:16


 


Pulse  117  


 


Resp  40  


 


B/P (MAP) 48/24 (32)  55/26 (36) 61/28 (39)


 


Pulse Ox  77  





 1/26/19 1/26/19 1/26/19 1/26/19





 13:18 13:19 13:22 13:23


 


Pulse 123   ???


 


Resp 46   10


 


B/P (MAP)  106/--- (4563) 91/78 (82) 





 1/26/19 1/26/19 1/26/19 1/26/19





 13:24 13:26 13:28 13:30


 


Pulse   ??? 


 


Resp   72 


 


B/P (MAP) 50/31 (37) 53/--- (40) 160/116 (131) 146/113 (124)





 1/26/19 1/26/19 1/26/19 1/26/19





 13:32 13:33 13:34 13:36


 


Pulse  ???  


 


Resp  21  


 


B/P (MAP) 59/33 (42)  118/94 (102) 131/111 (118)





 1/26/19 1/26/19 1/26/19 1/26/19





 13:38 13:40 13:42 13:43


 


Pulse 90   ???


 


Resp 19   101


 


B/P (MAP) 43/23 (30) 81/22 (41) +++/214 (-40) 





 1/26/19 1/26/19 1/26/19 1/26/19





 13:46 13:47 13:48 13:49


 


Pulse   115 


 


Resp   19 


 


B/P (MAP) 43/33 (36)   58/25 (36)


 


FiO2  100.0  





 1/26/19 1/26/19 1/26/19 1/26/19





 13:50 13:52 13:53 13:55


 


Pulse   65 


 


Resp   20 


 


B/P (MAP) 66/43 (51) 105/35 (58)  116/55 (75)





 1/26/19 1/26/19 1/26/19 1/26/19





 13:57 13:58 13:59 14:01


 


Pulse  113  


 


Resp  30  


 


B/P (MAP) 167/63 (97)  172/136 (148) 159/95 (116)





 1/26/19 1/26/19 1/26/19 1/26/19





 14:03 14:04 14:05 14:08


 


Pulse 116   111


 


Resp 32   34


 


B/P (MAP) 112/88 (96) ???/??? (3055) 198/189 (192) 


 


Pulse Ox 55   ???





 1/26/19 1/26/19 1/26/19 1/26/19





 14:10 14:13 14:15 14:18


 


Pulse  44  56


 


Resp  15  101


 


B/P (MAP) 221/216 (218)  148/131 (137) 


 


Pulse Ox  ???  ???





 1/26/19 1/26/19 1/26/19 1/26/19





 14:30 14:32 14:34 14:35


 


Pulse 108   106


 


Resp 29   30


 


B/P (MAP) 46/27 (33) 79/--- (48) 116/52 (73) 


 


Pulse Ox 65   62





 1/26/19 1/26/19 1/26/19 1/26/19





 14:38 14:40 14:42 14:44


 


Pulse  103  


 


Resp  31  


 


B/P (MAP) 46/27 (33) 130/88 (102) 115/99 (104) 106/95 (99)


 


Pulse Ox  60  





 1/26/19 1/26/19 1/26/19 1/26/19





 14:45 14:46 14:48 14:50


 


Pulse 103   105


 


Resp 32   33


 


B/P (MAP)  125/65 (85) 43/--- (36) 108/86 (93)


 


Pulse Ox 62   55





 1/26/19 1/26/19 1/26/19 1/26/19





 14:52 14:54 14:55 14:58


 


Pulse   103 


 


Resp   32 


 


B/P (MAP) 95/82 (86) 51/21 (31) 39/20 (26) 58/21 (33)


 


Pulse Ox   55 





 1/26/19 1/26/19 1/26/19 1/26/19





 15:00 15:01 15:02 15:05


 


Pulse 102   102


 


Resp 33   34


 


B/P (MAP) 92/--- (93) 57/--- (41) 109/81 (90) ???/??? (3211)





 1/26/19 1/26/19 1/26/19 





 15:09 15:10 16:00 


 


Pulse  102  


 


Resp  34  


 


B/P (MAP) 110/89 (96)   


 


FiO2   100.0 








Physical Exam


 General Appearance: The patient is alert, has no immediate need for airway 


protection 


ENT, Mouth: Mucous membranes are dry


Respiratory: There are no retractions, lungs are clear to auscultation.


Cardiovascular: tachcardic and regular rhythm. 


Gastrointestinal:  Abdomen is soft with well healed surgical scars. Diffuse, non


focal TTP


Neurological: GCS 15, strength and sensation grossly in tact


Skin: cold and dry, mottled


Neck is supple non tender.


Extremities are nontender, nonswollen and have full range of motion.





DIFFERENTIAL DIAGNOSIS: After history and physical exam differential diagnosis 


was considered for abdominal pain including but not limited to appendicitis, 


cholecystitis, gastritis and urinary tract infection.





Medical Decision Making


Data Points


Result Diagram:  


1/26/19 1427                                                                    


           1/26/19 1530





Laboratory





Hematology








Test


 1/26/19


12:25 1/26/19


12:33 1/26/19


14:27 1/26/19


14:39


 


Sodium Level


 134 mmol/L


(137-145) 


 


 





 


Potassium Level


 4.6 mmol/L


(3.5-5.0) 


 


 





 


Chloride Level


 105 mmol/L


() 


 


 





 


Carbon Dioxide Level


 18 mmol/L


(22-31) 


 


 





 


Blood Urea Nitrogen


 34 mg/dl


(7-18) 


 


 





 


Creatinine


 2.10 mg/dl


(0.52-1.04) 


 


 





 


Glomerular Filtration Rate


Calc 24.5 


 


 


 





 


Calcium Level


 8.5 mg/dl


(8.4-10.2) 


 


 





 


Total Bilirubin


 0.9 mg/dl


(0.2-1.3) 


 


 





 


Aspartate Amino Transf


(AST/SGOT) 34 U/L (0-35) 


 


 


 





 


Alanine Aminotransferase


(ALT/SGPT) 30 U/L (0-56) 


 


 


 





 


Alkaline Phosphatase 65 U/L (0-126)    


 


Troponin I 0.199 ng/ml    


 


Total Protein


 6.7 g/dl


(6.3-8.2) 


 


 





 


Albumin


 3.4 g/dl


(3.5-5.0) 


 


 





 


D-Dimer Quantitative (PE/DVT)


 


 19.39 ug/ml


(0-0.50) 


 





 


Stool Occult Blood (IFOB)


 


 Negative


(NEGATIVE) 


 





 


Venous Blood pH


 


 7.27


(7.31-7.41) 


 





 


Venous Blood Partial Pressure


CO2 


 < 25 mmHg 


 


 





 


Venous Blood Partial Pressure


O2 


 60 mmHg 


 


 





 


Venous Blood HCO3  9 mmol/L   


 


Venous Blood Oxygen Saturation  94 %   


 


Venous Blood Base Excess  -19 mmol/L   


 


Lactate


 


 3.6 mmol/L


(0.7-2.1) 


 





 


Red Blood Count


 


 


 4.48 M/uL


(4.17-5.56) 





 


Mean Corpuscular Volume


 


 


 104.6 fL


(80.0-96.0) 





 


Mean Corpuscular Hemoglobin


 


 


 31.5 pg


(26.0-33.0) 





 


Mean Corpuscular Hemoglobin


Concent 


 


 30.1 g/dL


(32.0-36.0) 





 


Red Cell Distribution Width


 


 


 16.6 %


(11.5-14.5) 





 


Mean Platelet Volume


 


 


 8.3 fL


(7.2-11.1) 





 


Neutrophils (%) (Auto)


 


 


 56.4 %


(39.4-72.5) 





 


Lymphocytes (%) (Auto)


 


 


 34.8 %


(17.6-49.6) 





 


Monocytes (%) (Auto)


 


 


 7.7 %


(4.1-12.4) 





 


Eosinophils (%) (Auto)


 


 


 0.7 %


(0.4-6.7) 





 


Basophils (%) (Auto)


 


 


 0.4 %


(0.3-1.4) 





 


Nucleated RBC Relative Count


(auto) 


 


 0.3 /100WBC 


 





 


Neutrophils # (Auto)


 


 


 9.7 K/uL


(2.0-7.4) 





 


Lymphocytes # (Auto)


 


 


 6.0 K/uL


(1.3-3.6) 





 


Monocytes # (Auto)


 


 


 1.3 K/uL


(0.3-1.0) 





 


Eosinophils # (Auto)


 


 


 0.1 K/uL


(0.0-0.5) 





 


Basophils # (Auto)


 


 


 0.1 K/uL


(0.0-0.1) 





 


Nucleated RBC Absolute Count


(auto) 


 


 0.05 K/uL 


 





 


Peripheral Blood Smear   Yes Y/N  


 


Whole Blood Glucose


 


 


 


 223 mg/DL


()


 


Test


 1/26/19


15:30 1/26/19


15:31 


 





 


Random Glucose


 290 mg/dl


() 


 


 





 


Blood Gas Puncture Site  Line   


 


Blood Gas Patient Temperature  97.4 DEGREES   


 


Arterial Blood pH


 


 6.73


(7.35-7.45) 


 





 


Arterial Blood Partial


Pressure CO2 


 60 mmHg


(32-37) 


 





 


Arterial Blood Partial


Pressure O2 


 67 mmHg


(60-80) 


 





 


Arterial Blood HCO3


 


 8 mmol/L


(20-26) 


 





 


Arterial Blood Oxygen


Saturation 


 67 % () 


 


 





 


Arterial Blood Base Excess  -28.0 mmol/L   


 


Tuan Test  Nt avail   


 


Oxygen Liters/Minute  100%   








Chemistry








Test


 1/26/19


12:25 1/26/19


12:33 1/26/19


14:27 1/26/19


14:39


 


Glomerular Filtration Rate


Calc 24.5 


 


 


 





 


Calcium Level


 8.5 mg/dl


(8.4-10.2) 


 


 





 


Total Bilirubin


 0.9 mg/dl


(0.2-1.3) 


 


 





 


Aspartate Amino Transf


(AST/SGOT) 34 U/L (0-35) 


 


 


 





 


Alanine Aminotransferase


(ALT/SGPT) 30 U/L (0-56) 


 


 


 





 


Alkaline Phosphatase 65 U/L (0-126)    


 


Troponin I 0.199 ng/ml    


 


Total Protein


 6.7 g/dl


(6.3-8.2) 


 


 





 


Albumin


 3.4 g/dl


(3.5-5.0) 


 


 





 


D-Dimer Quantitative (PE/DVT)


 


 19.39 ug/ml


(0-0.50) 


 





 


Stool Occult Blood (IFOB)


 


 Negative


(NEGATIVE) 


 





 


Venous Blood pH


 


 7.27


(7.31-7.41) 


 





 


Venous Blood Partial Pressure


CO2 


 < 25 mmHg 


 


 





 


Venous Blood Partial Pressure


O2 


 60 mmHg 


 


 





 


Venous Blood HCO3  9 mmol/L   


 


Venous Blood Oxygen Saturation  94 %   


 


Venous Blood Base Excess  -19 mmol/L   


 


Lactate


 


 3.6 mmol/L


(0.7-2.1) 


 





 


White Blood Count


 


 


 17.1 k/uL


(4.5-11.0) 





 


Red Blood Count


 


 


 4.48 M/uL


(4.17-5.56) 





 


Hemoglobin


 


 


 14.1 g/dL


(12.0-16.0) 





 


Hematocrit


 


 


 46.8 %


(34.0-47.0) 





 


Mean Corpuscular Volume


 


 


 104.6 fL


(80.0-96.0) 





 


Mean Corpuscular Hemoglobin


 


 


 31.5 pg


(26.0-33.0) 





 


Mean Corpuscular Hemoglobin


Concent 


 


 30.1 g/dL


(32.0-36.0) 





 


Red Cell Distribution Width


 


 


 16.6 %


(11.5-14.5) 





 


Platelet Count


 


 


 78 K/uL


(150-450) 





 


Mean Platelet Volume


 


 


 8.3 fL


(7.2-11.1) 





 


Neutrophils (%) (Auto)


 


 


 56.4 %


(39.4-72.5) 





 


Lymphocytes (%) (Auto)


 


 


 34.8 %


(17.6-49.6) 





 


Monocytes (%) (Auto)


 


 


 7.7 %


(4.1-12.4) 





 


Eosinophils (%) (Auto)


 


 


 0.7 %


(0.4-6.7) 





 


Basophils (%) (Auto)


 


 


 0.4 %


(0.3-1.4) 





 


Nucleated RBC Relative Count


(auto) 


 


 0.3 /100WBC 


 





 


Neutrophils # (Auto)


 


 


 9.7 K/uL


(2.0-7.4) 





 


Lymphocytes # (Auto)


 


 


 6.0 K/uL


(1.3-3.6) 





 


Monocytes # (Auto)


 


 


 1.3 K/uL


(0.3-1.0) 





 


Eosinophils # (Auto)


 


 


 0.1 K/uL


(0.0-0.5) 





 


Basophils # (Auto)


 


 


 0.1 K/uL


(0.0-0.1) 





 


Nucleated RBC Absolute Count


(auto) 


 


 0.05 K/uL 


 





 


Peripheral Blood Smear   Yes Y/N  


 


Whole Blood Glucose


 


 


 


 223 mg/DL


()


 


Test


 1/26/19


15:30 1/26/19


15:31 


 





 


Blood Gas Puncture Site  Line   


 


Blood Gas Patient Temperature  97.4 DEGREES   


 


Arterial Blood pH


 


 6.73


(7.35-7.45) 


 





 


Arterial Blood Partial


Pressure CO2 


 60 mmHg


(32-37) 


 





 


Arterial Blood Partial


Pressure O2 


 67 mmHg


(60-80) 


 





 


Arterial Blood HCO3


 


 8 mmol/L


(20-26) 


 





 


Arterial Blood Oxygen


Saturation 


 67 % () 


 


 





 


Arterial Blood Base Excess  -28.0 mmol/L   


 


Tuan Test  Nt avail   


 


Oxygen Liters/Minute  100%   








Coagulation








Test


 1/26/19


12:33


 


D-Dimer Quantitative (PE/DVT) 19.39 ug/ml 











ED Course/Re-evaluation


ED Course


Nausea, diffuse abdominal pain, and 2 episodes of vomiting at home this morning.


Then 2 episodes of pre-syncope where she felt lightheaded and had to sit down 


for fear of having syncope. No chest pain or SOB. No fever/chills. Soft BP upon 


arrival, but quickly worsened. Skin mottled and worsening hypotension. 


Resuscitation started including emergent femoral triple lumen that was placed. 


Given abx and NS. At one point questionable positive FAST and drop in H/H 


(likely dilutional), so pt. given 2 units PRBCs. No evidence of right heart 


strain on bedside US. 5 episodes of initiation of CPR due to PEA. Multiple 


pressors started due to persistent hypotension. Intubated due to reps distress 


and worsening mental status. Able to obtain CT scan in between episodes of CPR. 


Dr. Monteiro called due to blood developed at the liver tip and persistent 


hypotension. CT scan shows adrenal hemorrhage likely from sepsis. Had multiple 


extensive conversations with family about risks/benefits of transfer while so 


critically ill. Family agrees to transfer. Accepted by critcal care team at [facility].


No urine or urine culture sent, b/c patient just recently making urine. Pt. with


elevated dimer likely from DIC.


Procedure





Procedure: Central line placement.





After verbal informed consent from patient; with the risks explained to be 


bleeding, infection.; minimal sterile barrier technique was used due to emergent


nature, gown, sterile gloves, hand washing and chlorhexidine prep.  The area 


anesthetized with 1% lidocaine.  The right femoral vein was punctured with a 19 


gauge finder needle, then a wire introducer was placed, a 7 [place] triple lumen 


was placed using Seldinger technique.  There were no complications.  Blood 


return low pressure, dark blood.  Patient tolerated procedure well. 


The procedure was performed by myself.














Procedure: Rapid sequence intubation.





Indication for the procedure was respiratory failure.  The patient was 


preoxygenated with 100% oxygen by face mask.  The patient was given the 


following IV medications: Etomidate, succinylcholine.  The patient was orally 


endotracheally intubated under direct visualization with a 7.5 ETT.    Tracheal 


intubation was confirmed with misting on the tube; breath sounds were 


auscultated equally bilaterally; appropriate color change with [place]


CO2 detector.  Chest X-ray shows ETT in good position.  The procedure was 


performed by myself.


Decision to Disposition Date:  Jan 26, 2019


Decision to Disposition Time:  17:02





Depart


Departure


Latest Vital Signs





Vital Signs








  Date Time  Temp Pulse Resp B/P (MAP) Pulse Ox O2 Delivery O2 Flow Rate FiO2


 


1/26/19 16:00        100.0


 


1/26/19 15:10  102 34     


 


1/26/19 15:09    110/89 (96)    


 


1/26/19 14:55     55   


 


1/26/19 11:55       10.0 


 


1/26/19 11:44 97.4     Non-Rebreather  








Impression:  


   Primary Impression:  


   Adrenal hemorrhage


   Additional Impressions:  


   Septic shock


   Cardiac arrest with successful resuscitation


   Hypothermia


   Acute respiratory failure with hypoxia and hypercarbia


   Acidosis, metabolic


   Coagulopathy


Condition:  Critical


Disposition:  XFER TO [facility]


Referrals:  


JEN SAMPSON MD (PCP)





Problem Qualifiers








   Additional Impressions:  


   Hypothermia


   Encounter type:  initial encounter  Qualified Codes:  T68.XXXA - Hypothermia,


   initial encounter








KENN PARADA MD                 Jan 26, 2019 11:51

## 2019-01-26 NOTE — EKG
FACILITY: [facility] - [place] 

 

PATIENT NAME: NÉSTOR MOORE

: [id number]

MR: [id number]

V: [id number]

EXAM DATE: 

ORDERING PHYSICIAN: KENN PARADA

TECHNOLOGIST: 

 

Test Reason : pre syncope

Blood Pressure : ***/*** mmHG

Vent. Rate : 121 BPM     Atrial Rate : 121 BPM

   P-R Int : 122 ms          QRS Dur : 094 ms

    QT Int : 344 ms       P-R-T Axes : 018 045 -38 degrees

   QTc Int : 488 ms

 

Sinus tachycardia

Nonspecific interventricular conduction delay

T wave inversion inferior leads

Nonspecific ST diffusely

Confirmed by CLYDE OQUENDO ([id number]) on 2019 9:13:47 PM

 

Referred By:             Confirmed By:CLYDE OQUENDO

## 2019-01-26 NOTE — RADIOLOGY IMAGING REPORT
FACILITY: [facility] - [place] 

 

PATIENT NAME: Yokasta Dowd

: 1965

MR: [id number]

V: [id number]

EXAM DATE: 

ORDERING PHYSICIAN: KENN PARADA

TECHNOLOGIST: 

 

Location: [facility] [place]

Patient: Yokasta Dowd

: 1965

MRN: [id number]

Visit/Account:[id number]

Date of Sevice:  2019

 

ACCESSION #: [id number]

 

EXAMINATION: CT chest, abdomen, and pelvis without IV contrast

 

HISTORY: Hypotensive, positive fast, code. Recent surgery.

 

TECHNIQUE:   Axial CT images of the chest, abdomen, and pelvis were obtained without IV contrast, wit
h coronal and sagittal 2D reconstructed images.

One of the following dose optimization techniques was utilized in the performance of this exam: Autom
ated exposure control; adjustment of the mA and/or kV according to the patient's size; or use of an i
terative  reconstruction technique.  Specific details can be referenced in the facility's radiology C
T exam operational policy.

 

COMPARISON:

CT abdomen 10/8/2018.

CT chest 2016.

 

FINDINGS:

 

Chest:

Lungs and pleura:  There is dense consolidation throughout the posterior aspect of the upper and lowe
r lobes which may represent a combination of pneumonitis, atelectasis, and/or aspiration. No pleural 
effusion or pneumothorax. Endotracheal tube tip 2.5 cm above the iron.

Mediastinum and otto:  Small amount of nonspecific fluid adjacent to the ascending thoracic aorta. Me
diastinal structures are otherwise unremarkable.

Heart, aorta, and great vessels:  Normal caliber thoracic aorta. Normal heart size. No pericardial ef
fusion. There is a small amount of scattered intravascular air present within the upper extremity vei
ns, right atrial appendage, right ventricle, and in the main pulmonary artery.

Chest lymph node assessment:  Negative.

 

Bones:  There is mild osseous irregularity along the mid body of the sternum. There is some mild mahi
on artifact at this level but this may represent a nondisplaced sternal fracture related to CPR. Trac
e amount of retrosternal hemorrhage. No definite rib fracture in the chest, allowing for mild motion 
artifact.

Chest wall:  Negative.

Lower neck:  Negative.

 

 

Abdomen/pelvis:

Liver:  Trace amount of perihepatic fluid. Normal hepatic size and morphology.

Gallbladder and bile ducts:  The gallbladder is surgically absent.

Spleen:  Negative.

Pancreas:  Normal pancreatic morphology. There is some retroperitoneal stranding adjacent to the panc
reas which is nonspecific but should be correlated with any clinical and/or laboratory evidence for p
ancreatitis.

Adrenal glands:  There is a new 2.7 cm hyperdense left adrenal mass, compatible with acute adrenal he
morrhage. The right adrenal gland is unremarkable.

Kidneys:  Normal size and morphology of both kidneys. There is nonspecific perinephric stranding surr
ounding both kidneys. No hydronephrosis or urinary calculi.

 

Bowel and peritoneum:  The small bowel and colon are normal in caliber. There is mild diffuse mesente
donta edema. Trace amount of free fluid in the pelvis. No free intraperitoneal air.

Pelvic  structures:  The urinary bladder is decompressed, with a Arguello catheter in place. Prior hys
terectomy.

Lymph node assessment:  Negative.

Vessels:  The abdominal aorta and IVC are normal in caliber. Mild vascular calcifications. Right femo
ral CVC in place with tip in the distal right common iliac vein. There is some mild intravascular air
 in the right iliac veins.

 

Musculoskeletal:   No acute osseous findings in the lumbar spine or bony pelvis.

Body wall:  New surgical changes along the anterior abdominal wall related to midline ventral hernia 
repair. There is fluid and a small amount of air within the potential space at the site of the prior 
abdominal wall hernia which can be a normal postoperative finding.

 

IMPRESSION:

1. Dense consolidation in the posterior aspect of the upper and lower lungs. This may represent a com
bination of pneumonia, aspiration, and/or atelectasis.

2. Small amount of nonspecific fluid adjacent to the ascending thoracic aorta.

3. Likely nondisplaced fractu e along the mid sternal body with a small amount of retrosternal hemorr
frances.

4. Acute left adrenal hemorrhage with focal hematoma measuring 2.7 cm.5. There is nonspecific retrope
ritoneal edema adjacent to the pancreas with perinephric stranding. This may partly relate to the lef
t adrenal hemorrhage. Correlate with any clinical and/or laboratory evidence for pancreatitis. Nonspe
cific perinephric stranding surrounds both kidneys.

6. New surgical changes of midline ventral hernia repair, with some fluid and a small amount of air w
ithin the potential space along the soft tissues of the anterior abdominal wall which can be a normal
 postoperative finding.

7. Trace amount of free fluid in the abdomen and pelvis.

8. Small bowel and colon are normal in caliber. Mild diffuse mesenteric edema.

 

Findings were discussed with Zeeshan Gomez MD at 2019 2:45 PM.

 

Report Dictated By: Kirk Mcguire MD at 2019 2:33 PM

 

Report E-Signed By: Kirk Mcguire MD  at 2019 3:07 PM

 

WSN:[id number]

## 2019-01-27 NOTE — EKG
FACILITY: [facility] - [place] 

 

PATIENT NAME: NÉSTOR MOORE

: [id number]

MR: [id number]

V: [id number]

EXAM DATE: 

ORDERING PHYSICIAN: KENN PARADA

TECHNOLOGIST: 

 

Test Reason : repeat

Blood Pressure : ***/*** mmHG

Vent. Rate : 119 BPM     Atrial Rate : 119 BPM

   P-R Int : 120 ms          QRS Dur : 090 ms

    QT Int : 360 ms       P-R-T Axes : 024 088 029 degrees

   QTc Int : 506 ms

 

Sinus tachycardia

Nonspecific intraventricular conduction delay.

Nonspecific ST abnormality

Abnormal ECG

When compared with ECG of 2019

No significant change was found

Confirmed by Austin Reyes ([id number]) on 2019 10:46:29 PM

 

Referred By:             Confirmed By:Austin Goss

## 2019-02-25 ENCOUNTER — HOSPITAL ENCOUNTER (OUTPATIENT)
Dept: HOSPITAL 89 - AMB | Age: 54
End: 2019-02-25
Payer: COMMERCIAL

## 2019-02-25 ENCOUNTER — HOSPITAL ENCOUNTER (INPATIENT)
Dept: HOSPITAL 89 - ER | Age: 54
LOS: 3 days | Discharge: HOME | DRG: 682 | End: 2019-02-28
Attending: FAMILY MEDICINE | Admitting: FAMILY MEDICINE
Payer: COMMERCIAL

## 2019-02-25 VITALS — DIASTOLIC BLOOD PRESSURE: 54 MMHG | SYSTOLIC BLOOD PRESSURE: 78 MMHG

## 2019-02-25 VITALS — SYSTOLIC BLOOD PRESSURE: 86 MMHG | DIASTOLIC BLOOD PRESSURE: 55 MMHG

## 2019-02-25 VITALS — DIASTOLIC BLOOD PRESSURE: 51 MMHG | SYSTOLIC BLOOD PRESSURE: 80 MMHG

## 2019-02-25 VITALS
HEIGHT: 65 IN | BODY MASS INDEX: 28.82 KG/M2 | HEIGHT: 65 IN | WEIGHT: 173 LBS | BODY MASS INDEX: 28.82 KG/M2 | WEIGHT: 173 LBS

## 2019-02-25 VITALS — DIASTOLIC BLOOD PRESSURE: 48 MMHG | SYSTOLIC BLOOD PRESSURE: 72 MMHG

## 2019-02-25 VITALS — DIASTOLIC BLOOD PRESSURE: 52 MMHG | SYSTOLIC BLOOD PRESSURE: 78 MMHG

## 2019-02-25 VITALS — SYSTOLIC BLOOD PRESSURE: 71 MMHG | DIASTOLIC BLOOD PRESSURE: 47 MMHG

## 2019-02-25 VITALS — DIASTOLIC BLOOD PRESSURE: 52 MMHG | SYSTOLIC BLOOD PRESSURE: 79 MMHG

## 2019-02-25 VITALS — DIASTOLIC BLOOD PRESSURE: 54 MMHG | SYSTOLIC BLOOD PRESSURE: 81 MMHG

## 2019-02-25 VITALS — BODY MASS INDEX: 28.79 KG/M2

## 2019-02-25 VITALS — SYSTOLIC BLOOD PRESSURE: 80 MMHG | DIASTOLIC BLOOD PRESSURE: 51 MMHG

## 2019-02-25 VITALS — DIASTOLIC BLOOD PRESSURE: 73 MMHG | SYSTOLIC BLOOD PRESSURE: 102 MMHG

## 2019-02-25 VITALS — DIASTOLIC BLOOD PRESSURE: 54 MMHG | SYSTOLIC BLOOD PRESSURE: 94 MMHG

## 2019-02-25 DIAGNOSIS — E11.9: ICD-10-CM

## 2019-02-25 DIAGNOSIS — Z79.4: ICD-10-CM

## 2019-02-25 DIAGNOSIS — Z86.711: ICD-10-CM

## 2019-02-25 DIAGNOSIS — R42: ICD-10-CM

## 2019-02-25 DIAGNOSIS — E87.5: ICD-10-CM

## 2019-02-25 DIAGNOSIS — I10: ICD-10-CM

## 2019-02-25 DIAGNOSIS — E27.49: ICD-10-CM

## 2019-02-25 DIAGNOSIS — Z79.01: ICD-10-CM

## 2019-02-25 DIAGNOSIS — J15.9: ICD-10-CM

## 2019-02-25 DIAGNOSIS — R53.1: Primary | ICD-10-CM

## 2019-02-25 DIAGNOSIS — I95.9: ICD-10-CM

## 2019-02-25 DIAGNOSIS — E66.9: ICD-10-CM

## 2019-02-25 DIAGNOSIS — E86.0: ICD-10-CM

## 2019-02-25 DIAGNOSIS — N17.9: Primary | ICD-10-CM

## 2019-02-25 LAB
INR PPP: 1.25
PLATELET COUNT, AUTOMATED: 271 K/UL (ref 150–450)

## 2019-02-25 PROCEDURE — 96375 TX/PRO/DX INJ NEW DRUG ADDON: CPT

## 2019-02-25 PROCEDURE — 99285 EMERGENCY DEPT VISIT HI MDM: CPT

## 2019-02-25 PROCEDURE — 84075 ASSAY ALKALINE PHOSPHATASE: CPT

## 2019-02-25 PROCEDURE — 82947 ASSAY GLUCOSE BLOOD QUANT: CPT

## 2019-02-25 PROCEDURE — 82948 REAGENT STRIP/BLOOD GLUCOSE: CPT

## 2019-02-25 PROCEDURE — 83690 ASSAY OF LIPASE: CPT

## 2019-02-25 PROCEDURE — 81001 URINALYSIS AUTO W/SCOPE: CPT

## 2019-02-25 PROCEDURE — 85025 COMPLETE CBC W/AUTO DIFF WBC: CPT

## 2019-02-25 PROCEDURE — 93976 VASCULAR STUDY: CPT

## 2019-02-25 PROCEDURE — 82565 ASSAY OF CREATININE: CPT

## 2019-02-25 PROCEDURE — 82310 ASSAY OF CALCIUM: CPT

## 2019-02-25 PROCEDURE — 82040 ASSAY OF SERUM ALBUMIN: CPT

## 2019-02-25 PROCEDURE — 96367 TX/PROPH/DG ADDL SEQ IV INF: CPT

## 2019-02-25 PROCEDURE — 71046 X-RAY EXAM CHEST 2 VIEWS: CPT

## 2019-02-25 PROCEDURE — 82374 ASSAY BLOOD CARBON DIOXIDE: CPT

## 2019-02-25 PROCEDURE — 74176 CT ABD & PELVIS W/O CONTRAST: CPT

## 2019-02-25 PROCEDURE — 83605 ASSAY OF LACTIC ACID: CPT

## 2019-02-25 PROCEDURE — 84155 ASSAY OF PROTEIN SERUM: CPT

## 2019-02-25 PROCEDURE — 85610 PROTHROMBIN TIME: CPT

## 2019-02-25 PROCEDURE — 93005 ELECTROCARDIOGRAM TRACING: CPT

## 2019-02-25 PROCEDURE — 36416 COLLJ CAPILLARY BLOOD SPEC: CPT

## 2019-02-25 PROCEDURE — 87040 BLOOD CULTURE FOR BACTERIA: CPT

## 2019-02-25 PROCEDURE — 96376 TX/PRO/DX INJ SAME DRUG ADON: CPT

## 2019-02-25 PROCEDURE — 84460 ALANINE AMINO (ALT) (SGPT): CPT

## 2019-02-25 PROCEDURE — 96361 HYDRATE IV INFUSION ADD-ON: CPT

## 2019-02-25 PROCEDURE — 96365 THER/PROPH/DIAG IV INF INIT: CPT

## 2019-02-25 PROCEDURE — 84450 TRANSFERASE (AST) (SGOT): CPT

## 2019-02-25 PROCEDURE — 06HM33Z INSERTION OF INFUSION DEVICE INTO RIGHT FEMORAL VEIN, PERCUTANEOUS APPROACH: ICD-10-PCS | Performed by: EMERGENCY MEDICINE

## 2019-02-25 PROCEDURE — 87502 INFLUENZA DNA AMP PROBE: CPT

## 2019-02-25 PROCEDURE — 84132 ASSAY OF SERUM POTASSIUM: CPT

## 2019-02-25 PROCEDURE — 82435 ASSAY OF BLOOD CHLORIDE: CPT

## 2019-02-25 PROCEDURE — 84520 ASSAY OF UREA NITROGEN: CPT

## 2019-02-25 PROCEDURE — 85730 THROMBOPLASTIN TIME PARTIAL: CPT

## 2019-02-25 PROCEDURE — 36415 COLL VENOUS BLD VENIPUNCTURE: CPT

## 2019-02-25 PROCEDURE — 84295 ASSAY OF SERUM SODIUM: CPT

## 2019-02-25 PROCEDURE — 84484 ASSAY OF TROPONIN QUANT: CPT

## 2019-02-25 PROCEDURE — 82247 BILIRUBIN TOTAL: CPT

## 2019-02-25 RX ADMIN — WARFARIN SODIUM SCH MG: 5 TABLET ORAL at 16:36

## 2019-02-25 RX ADMIN — THIAMINE HYDROCHLORIDE PRN MLS/HR: 100 INJECTION, SOLUTION INTRAMUSCULAR; INTRAVENOUS at 16:00

## 2019-02-25 RX ADMIN — INSULIN LISPRO PRN UNIT: 100 INJECTION, SOLUTION INTRAVENOUS; SUBCUTANEOUS at 21:56

## 2019-02-25 RX ADMIN — ENOXAPARIN SODIUM SCH MG: 100 INJECTION SUBCUTANEOUS at 21:26

## 2019-02-25 NOTE — RADIOLOGY IMAGING REPORT
FACILITY: [facility] 

 

PATIENT NAME: Yokasta Dowd

: 1965

MR: [id number]

V: [id number]

EXAM DATE: 

ORDERING PHYSICIAN: VEDA SUE

TECHNOLOGIST: 

 

Location: [facility] - [place]

Patient: Yokasta Dowd

: 1965

MRN: [id number]

Visit/Account:[id number]

Date of Sevice:  2019

 

ACCESSION #: [id number]

 

Exam type: CHEST PA LAT

 

History: FEVER

 

Comparison: 2019.

 

Findings:

 

There is subtle linear stranding in the left upper lobe not appreciated on the prior study.  Remainde
r the lung fields appear free of consolidation.  There is no evidence of pleural effusions.  The card
iac silhouette is normal in size.  The trachea is in midline.

 

IMPRESSION:

 

1.  Subtle linear stranding in the left upper lobe may represent a developing infiltrate or discoid a
telectasis.

 

Report Dictated By: Karmen Figueroa MD at 2019 11:48 AM

 

Report E-Signed By: Karmen Figueroa MD  at 2019 11:50 AM

 

WSN:[id number]

## 2019-02-25 NOTE — EKG
[place] [facility] [place] 

 

PATIENT NAME: NÉSTOR MOORE

: [id number]

MR: [id number]

V: [id number]

EXAM DATE: 

ORDERING PHYSICIAN: VEDA SUE

TECHNOLOGIST: 

 

Test Reason : HYPER K

Blood Pressure : ***/*** mmHG

Vent. Rate : 074 BPM     Atrial Rate : 074 BPM

   P-R Int : 142 ms          QRS Dur : 074 ms

    QT Int : 426 ms       P-R-T Axes : 019 -16 070 degrees

   QTc Int : 472 ms

 

Normal sinus rhythm

Normal ECG

When compared with ECG of 2019 13:08,

Vent. rate has decreased BY  45 BPM

T wave inversion no longer evident in Inferior leads

Nonspecific T wave abnormality no longer evident in Anterior leads

Confirmed by MARS DE LUNA ([id number]) on 2019 6:54:55 PM

 

Referred By:  VIKRAM           Confirmed By:MARS DE LUNA

## 2019-02-25 NOTE — ER REPORT
History and Physical


Time Seen By MD:  10:11


HPI/ROS


CHIEF COMPLAINT: Chills, general malaise, shortness of breath





HISTORY OF PRESENT ILLNESS: Patient is a 53-year-old female here with complaints


of fever, chills, general malaise, weakness, shortness of breath which started 


this morning. Patient complains of achiness, feeling increasingly fatigued 


starting this morning. Patient reportedly was septic recently and went into 


cardiac arrest while in the emergency department. Patient reports having similar


symptoms to her prior episode.





REVIEW OF SYSTEMS:


Constitutional: + fever, + chills, + aching


Eyes: No discharge.


ENT: No sore throat. 


Cardiovascular: No chest pain, no palpitations.


Respiratory: No cough, + shortness of breath.


Gastrointestinal: No abdominal pain, no vomiting.


Genitourinary: No hematuria.


Musculoskeletal: No back pain.


Skin: No rashes.


Neurological: No headache.


Allergies:  


Coded Allergies:  


     No Known Drug Allergies (Verified , 18)


Home Meds


Active Scripts


Warfarin Sodium (COUMADIN) 1 Mg Tablet, 0.5 MG PO QDAY, #30 TAB


   Prov:JEN SAMPSON MD         19


Insulin Lispro 100 Un/Ml Pen (HUMALOG 3 ML PEN) 100 Unit/1 Ml Insuln.pen, 2-4 


UNIT SQ QHS, #1 BOX 11 Refills


   Bedtime insulin Per sliding scale QHS


   BG =no insulin


   -250=2 units


   -300= 3 units


   BG >300= 4 units and call MD


   Prov:JEN SAMPSON MD         19


Lisinopril (LISINOPRIL) 20 Mg Tablet, 1 TAB PO QDAY, #90 TAB 3 Refills


   Prov:JEN SAMPSON MD         19


Needles, Insulin Disposable (PEN NEEDLES) 1 Each Dis.needle, EA MC DAILY, #3 3 


Refills


   Prov:JEN SAMPSON MD         18


Flash Glucose Sensor (Freestyle Kody Sensor) 1 Each Kit, EACH SUBQ DAILY, #1


   Prov:JEN SAMPSON MD         10/3/18


Flash Glucose Scanning [place] (Freestyle Kody [place]) 1 Each Each, EACH ASDIR


ECTED, #1


   Prov:JEN SAMPSON MD         10/3/18


Fluticasone Prop 50 Mcg Ns (FLONASE 50 MCG NS) 16 Gm Spray.susp, 2 SPRAYS NS 


QDAY, #1 BOT 3 Refills


   Prov:JEN SAMPSON MD         10/2/18


Reported Medications


Pantoprazole Sodium (PANTOPRAZOLE SODIUM) 40 Mg Tablet.dr, 40 MG PO QDAY, TAB.SR


   19


Warfarin Sodium (WARFARIN SODIUM) 4 Mg Tablet, 4 MG PO QDAY


   19


Potassium Chloride (KLOR-CON M20) 20 Meq Tab.er.prt, 20 MEQ PO TID


   19


Metoprolol Tartrate (METOPROLOL TARTRATE) 25 Mg Tablet, 1 TAB PO BID, TAB


   19


Insulin Lispro 100 Un/Ml Pen (HUMALOG 3 ML PEN) 100 Unit/1 Ml Insuln.pen, 2-12 


UNIT SQ TID, DIS.SYR


   1 unit per 15 grams of carbohydrates.


   Sliding scale for mealtime insulin:


   BG =no insulin


   -200= 2 units


   -250=4 units


   -300= 6 units


   -350= 8 units


   -400= 10 units


   BG >400= 12 units and call MD


   19


Furosemide (FUROSEMIDE) 40 Mg Tablet, 1 TAB PO QAM, TAB


   19


Insulin Glargine 100 Un/Ml Pen (LANTUS SOLOSTAR PEN) 100 Unit/1 Ml Insuln.pen, 


10 UNITS SQ QHS, PEN


   19


Cholecalciferol (Vitamin D3) (VITAMIN D-3) 2,000 Unit Capsule, 2000 UNIT PO 


QDAY, CAPSULE


   3/14/17


Discontinued Reported Medications


Aspirin (Aspirin) 81 Mg Tablet.dr, 1 TAB PO QDAY, 0 Refills


   12


Discontinued Scripts


Docusate Sodium (DOCUSATE SODIUM) 100 Mg Capsule, 1 CAP PO BID, #30 CAPSULE 0 


Refills


   Prov:ULLRICH,JOHN A MD         19


Oxycodone/Acetaminophen (OXYCODONE/ACETAMINOPHEN 5MG/325 MG) 5 Mg/325 Mg Tab, 1 


TAB PO Q4H PRN for MODERATE PAIN, #30 TAB 0 Refills


   Prov:ULLRICH,JOHN A MD         19


Empagliflozin (Jardiance) 10 Mg Tablet, 1 TAB PO DAILY, #90 TAB 3 Refills


   Prov:JEN SAMPSON MD         10/29/18


Dulaglutide (Trulicity) 1.5 Mg/0.5 Ml Pen.injctr, 1.5 MG SQ weekly, #4 MISC 11 


Refills


   Prov:JEN SAMPSON MD         18


Rosuvastatin Calcium (CRESTOR) 40 Mg Tablet, 1 TAB PO QDAY, #90 TAB 3 Refills


   Prov:JEN SAMPSON MD         3/21/18


Insulin Degludec (Tresiba Flextouch U-100) 100 Unit/Ml (3 Ml) Insuln.pen, 96 


UNITS SUBQ QAM, #2 BOX 11 Refills


   Prov:JEN SAMPSON MD         3/5/18


Metformin Hcl (METFORMIN HCL) 1,000 Mg Tablet, 1 TAB PO BID, #180 TAB 3 Refills


   Prov:JEN SAMPSON MD         17


Hydrochlorothiazide (HYDROCHLOROTHIAZIDE) 12.5 Mg Tablet, 1 TAB PO QDAY, #90 TAB


4 Refills


   Prov:JEN SAMPSON MD         17


Hx Smoking:  No


Smoking Status:  Never Smoker


Exposure to Second Hand Smoke?:  Yes (CHILDHOOD AND CURRENTLY)


Hx Substance Use Disorder:  No


Hx Alcohol Use:  No


Constitutional





Vital Sign - Last 24 Hours








 19





 10:09 10:11 10:14 10:22


 


Temp  97.8  


 


Pulse ??? 88  


 


Resp  16  


 


B/P (MAP)  116/76 116/76 (89) 


 


Pulse Ox  100  


 


O2 Delivery  Nasal Cannula  


 


O2 Flow Rate    2.0


 


    





 19





 10:24 10:39 10:54 11:09


 


Pulse 85 80 83 76


 


Resp  15 10 18


 


Pulse Ox  99  





 19





 11:11 11:16 11:34 11:36


 


Pulse  76  70


 


Resp  6  12


 


B/P (MAP) 113/42 (65)  110/59 (76) 


 


Pulse Ox  99  99





 19





 11:56 12:16 12:36 12:44


 


Pulse 66 80 75 


 


Resp 9 10 16 


 


B/P (MAP)    113/78 (90)


 


Pulse Ox 100 99 97 





 19





 12:56 13:00 13:05 13:20


 


Pulse 78  80 77


 


Resp 15  18 11


 


B/P (MAP)  116/65 (82)  


 


Pulse Ox 99  98 97





 19





 13:30 13:35 13:40 13:55


 


Pulse  75 78 76


 


Resp  13 10 8


 


B/P (MAP) 127/79 (95)   


 


Pulse Ox  96 97 100





 19 





 14:00 14:10 14:25 


 


Pulse  86 80 


 


Resp  15 9 


 


B/P (MAP) 127/88 (101)   


 


Pulse Ox  98 98 








Physical Exam


   General Appearance: The patient is alert, has no immediate need for airway 


protection and no signs of toxicity. Anxious appearing, uncomfortable


Eyes: Pupils equal and round no pallor or injection.


ENT, Mouth: Mucous membranes are moist.


Respiratory: There are no retractions, lungs are clear to auscultation.


Cardiovascular: Regular rate and rhythm. 


Gastrointestinal:  Abdomen is soft and non tender, no masses, bowel sounds 


normal.


Neurological: No focal deficits


Skin: Warm and dry, no rashes.


Musculoskeletal:  Neck is supple non tender.


      Extremities are nontender, nonswollen and have full range of motion.





DIFFERENTIAL DIAGNOSIS: After history and physical exam differential diagnosis 


was considered for adult fever including but not limited to viral syndromes 


including influenza, urinary tract infection, pneumonia and sepsis, electrolyte 


abnormality, dehydration





Medical Decision Making


Data Points


Result Diagram:  


19 1117                                                                    


           19 1349





Laboratory





Hematology








Test


 19


10:24 19


11:17 19


12:02 19


13:49


 


Influenza Virus Type A (PCR)


 Negative


(NEGATIVE) 


 


 





 


Influenza Virus Type B (PCR)


 Negative


(NEGATIVE) 


 


 





 


Red Blood Count


 


 3.71 M/uL


(4.17-5.56) 


 





 


Mean Corpuscular Volume


 


 97.3 fL


(80.0-96.0) 


 





 


Mean Corpuscular Hemoglobin


 


 31.9 pg


(26.0-33.0) 


 





 


Mean Corpuscular Hemoglobin


Concent 


 32.8 g/dL


(32.0-36.0) 


 





 


Red Cell Distribution Width


 


 16.0 %


(11.5-14.5) 


 





 


Mean Platelet Volume


 


 8.8 fL


(7.2-11.1) 


 





 


Neutrophils (%) (Auto)


 


 73.9 %


(39.4-72.5) 


 





 


Lymphocytes (%) (Auto)


 


 12.0 %


(17.6-49.6) 


 





 


Monocytes (%) (Auto)


 


 10.8 %


(4.1-12.4) 


 





 


Eosinophils (%) (Auto)


 


 2.5 %


(0.4-6.7) 


 





 


Basophils (%) (Auto)


 


 0.8 %


(0.3-1.4) 


 





 


Nucleated RBC Relative Count


(auto) 


 0.0 /100WBC 


 


 





 


Neutrophils # (Auto)


 


 7.2 K/uL


(2.0-7.4) 


 





 


Lymphocytes # (Auto)


 


 1.2 K/uL


(1.3-3.6) 


 





 


Monocytes # (Auto)


 


 1.1 K/uL


(0.3-1.0) 


 





 


Eosinophils # (Auto)


 


 0.2 K/uL


(0.0-0.5) 


 





 


Basophils # (Auto)


 


 0.1 K/uL


(0.0-0.1) 


 





 


Nucleated RBC Absolute Count


(auto) 


 0.00 K/uL 


 


 





 


Prothrombin Time


 


 15.8 seconds


(12.0-14.4) 


 





 


Prothromb Time International


Ratio 


 1.25 


 


 





 


Activated Partial


Thromboplast Time 


 25 seconds


(23-35) 


 





 


Troponin I  < 0.012 ng/ml   


 


Lipase


 


 459 U/L


() 


 





 


Urine Color   Yellow  


 


Urine Clarity


 


 


 Slightly-cloudy


 





 


Urine pH


 


 


 5.0 pH


(4.8-9.5) 





 


Urine Specific Gravity   1.008  


 


Urine Protein


 


 


 Negative mg/dL


(NEGATIVE) 





 


Urine Glucose (UA)


 


 


 Negative mg/dL


(NEGATIVE) 





 


Urine Ketones


 


 


 Negative mg/dL


(NEGATIVE) 





 


Urine Blood


 


 


 Small


(NEGATIVE) 





 


Urine Nitrite


 


 


 Negative


(NEGATIVE) 





 


Urine Bilirubin


 


 


 Negative


(NEGATIVE) 





 


Urine Urobilinogen


 


 


 Negative mg/dL


(0.2-1.9) 





 


Urine Leukocyte Esterase


 


 


 Negative


(NEGATIVE) 





 


Urine RBC


 


 


 None /HPF


(0-2/HPF) 





 


Urine WBC


 


 


 2 /HPF


(0-5/HPF) 





 


Urine Squamous Epithelial


Cells 


 


 Many /LPF


(</=FEW) 





 


Urine Bacteria


 


 


 Negative /HPF


(NONE-FEW) 





 


Urine Hyaline Casts


 


 


 Many /LPF


(NONE-FEW) 





 


Urine Mucus


 


 


 Few /HPF


(NONE-FEW) 





 


Sodium Level


 


 


 


 138 mmol/L


(137-145)


 


Potassium Level


 


 


 


 7.3 mmol/L


(3.5-5.0)


 


Chloride Level


 


 


 


 109 mmol/L


()


 


Carbon Dioxide Level


 


 


 


 20 mmol/L


(22-31)


 


Blood Urea Nitrogen


 


 


 


 33 mg/dl


(7-18)


 


Creatinine


 


 


 


 2.30 mg/dl


(0.52-1.04)


 


Glomerular Filtration Rate


Calc 


 


 


 22.1 





 


Random Glucose


 


 


 


 205 mg/dl


()


 


Lactate


 


 


 


 1.9 mmol/L


(0.7-2.1)


 


Calcium Level


 


 


 


 9.6 mg/dl


(8.4-10.2)


 


Total Bilirubin


 


 


 


 0.6 mg/dl


(0.2-1.3)


 


Aspartate Amino Transf


(AST/SGOT) 


 


 


 20 U/L (0-35) 





 


Alanine Aminotransferase


(ALT/SGPT) 


 


 


 18 U/L (0-56) 





 


Alkaline Phosphatase    91 U/L (0-126) 


 


Total Protein


 


 


 


 8.4 g/dl


(6.3-8.2)


 


Albumin


 


 


 


 4.6 g/dl


(3.5-5.0)








Chemistry








Test


 19


10:24 19


11:17 19


12:02 19


13:49


 


Influenza Virus Type A (PCR)


 Negative


(NEGATIVE) 


 


 





 


Influenza Virus Type B (PCR)


 Negative


(NEGATIVE) 


 


 





 


White Blood Count


 


 9.7 k/uL


(4.5-11.0) 


 





 


Red Blood Count


 


 3.71 M/uL


(4.17-5.56) 


 





 


Hemoglobin


 


 11.8 g/dL


(12.0-16.0) 


 





 


Hematocrit


 


 36.1 %


(34.0-47.0) 


 





 


Mean Corpuscular Volume


 


 97.3 fL


(80.0-96.0) 


 





 


Mean Corpuscular Hemoglobin


 


 31.9 pg


(26.0-33.0) 


 





 


Mean Corpuscular Hemoglobin


Concent 


 32.8 g/dL


(32.0-36.0) 


 





 


Red Cell Distribution Width


 


 16.0 %


(11.5-14.5) 


 





 


Platelet Count


 


 271 K/uL


(150-450) 


 





 


Mean Platelet Volume


 


 8.8 fL


(7.2-11.1) 


 





 


Neutrophils (%) (Auto)


 


 73.9 %


(39.4-72.5) 


 





 


Lymphocytes (%) (Auto)


 


 12.0 %


(17.6-49.6) 


 





 


Monocytes (%) (Auto)


 


 10.8 %


(4.1-12.4) 


 





 


Eosinophils (%) (Auto)


 


 2.5 %


(0.4-6.7) 


 





 


Basophils (%) (Auto)


 


 0.8 %


(0.3-1.4) 


 





 


Nucleated RBC Relative Count


(auto) 


 0.0 /100WBC 


 


 





 


Neutrophils # (Auto)


 


 7.2 K/uL


(2.0-7.4) 


 





 


Lymphocytes # (Auto)


 


 1.2 K/uL


(1.3-3.6) 


 





 


Monocytes # (Auto)


 


 1.1 K/uL


(0.3-1.0) 


 





 


Eosinophils # (Auto)


 


 0.2 K/uL


(0.0-0.5) 


 





 


Basophils # (Auto)


 


 0.1 K/uL


(0.0-0.1) 


 





 


Nucleated RBC Absolute Count


(auto) 


 0.00 K/uL 


 


 





 


Prothrombin Time


 


 15.8 seconds


(12.0-14.4) 


 





 


Prothromb Time International


Ratio 


 1.25 


 


 





 


Activated Partial


Thromboplast Time 


 25 seconds


(23-35) 


 





 


Troponin I  < 0.012 ng/ml   


 


Lipase


 


 459 U/L


() 


 





 


Urine Color   Yellow  


 


Urine Clarity


 


 


 Slightly-cloudy


 





 


Urine pH


 


 


 5.0 pH


(4.8-9.5) 





 


Urine Specific Gravity   1.008  


 


Urine Protein


 


 


 Negative mg/dL


(NEGATIVE) 





 


Urine Glucose (UA)


 


 


 Negative mg/dL


(NEGATIVE) 





 


Urine Ketones


 


 


 Negative mg/dL


(NEGATIVE) 





 


Urine Blood


 


 


 Small


(NEGATIVE) 





 


Urine Nitrite


 


 


 Negative


(NEGATIVE) 





 


Urine Bilirubin


 


 


 Negative


(NEGATIVE) 





 


Urine Urobilinogen


 


 


 Negative mg/dL


(0.2-1.9) 





 


Urine Leukocyte Esterase


 


 


 Negative


(NEGATIVE) 





 


Urine RBC


 


 


 None /HPF


(0-2/HPF) 





 


Urine WBC


 


 


 2 /HPF


(0-5/HPF) 





 


Urine Squamous Epithelial


Cells 


 


 Many /LPF


(</=FEW) 





 


Urine Bacteria


 


 


 Negative /HPF


(NONE-FEW) 





 


Urine Hyaline Casts


 


 


 Many /LPF


(NONE-FEW) 





 


Urine Mucus


 


 


 Few /HPF


(NONE-FEW) 





 


Glomerular Filtration Rate


Calc 


 


 


 22.1 





 


Lactate


 


 


 


 1.9 mmol/L


(0.7-2.1)


 


Calcium Level


 


 


 


 9.6 mg/dl


(8.4-10.2)


 


Total Bilirubin


 


 


 


 0.6 mg/dl


(0.2-1.3)


 


Aspartate Amino Transf


(AST/SGOT) 


 


 


 20 U/L (0-35) 





 


Alanine Aminotransferase


(ALT/SGPT) 


 


 


 18 U/L (0-56) 





 


Alkaline Phosphatase    91 U/L (0-126) 


 


Total Protein


 


 


 


 8.4 g/dl


(6.3-8.2)


 


Albumin


 


 


 


 4.6 g/dl


(3.5-5.0)








Coagulation








Test


 19


11:17


 


Prothrombin Time 15.8 seconds 


 


Prothromb Time International


Ratio 1.25 





 


Activated Partial


Thromboplast Time 25 seconds 











Urinalysis








Test


 19


12:02


 


Urine Color Yellow 


 


Urine Clarity


 Slightly-cloudy





 


Urine pH


 5.0 pH


(4.8-9.5)


 


Urine Specific Gravity 1.008 


 


Urine Protein


 Negative mg/dL


(NEGATIVE)


 


Urine Glucose (UA)


 Negative mg/dL


(NEGATIVE)


 


Urine Ketones


 Negative mg/dL


(NEGATIVE)


 


Urine Blood


 Small


(NEGATIVE)


 


Urine Nitrite


 Negative


(NEGATIVE)


 


Urine Bilirubin


 Negative


(NEGATIVE)


 


Urine Urobilinogen


 Negative mg/dL


(0.2-1.9)


 


Urine Leukocyte Esterase


 Negative


(NEGATIVE)


 


Urine RBC


 None /HPF


(0-2/HPF)


 


Urine WBC


 2 /HPF


(0-5/HPF)


 


Urine Squamous Epithelial


Cells Many /LPF


(</=FEW)


 


Urine Bacteria


 Negative /HPF


(NONE-FEW)


 


Urine Hyaline Casts


 Many /LPF


(NONE-FEW)


 


Urine Mucus


 Few /HPF


(NONE-FEW)











EKG/Imaging


Imaging


PATIENT NAME: Yokasta Dowd


: 1965


MR: [id number]


V: [id number]


EXAM DATE: 


ORDERING PHYSICIAN: VEDA SUE


TECHNOLOGIST: 


 


Location: [facility] [place]


Patient: Yokasta Dowd


: 1965


MRN: [id number]


Visit/Account:[id number]


Date of Sevice:  2019


 


ACCESSION #: [id number]


 


Exam type: CHEST PA LAT


 


History: FEVER


 


Comparison: 2019.


 


Findings:


 


There is subtle linear stranding in the left upper lobe not appreciated on the 


prior study.  Remainder the lung fields appear free of consolidation.  There is 


no evidence of pleural effusions.  The cardiac silhouette is normal in size.  


The trachea is in midline.


 


IMPRESSION:


 


1.  Subtle linear stranding in the left upper lobe may represent a developing 


infiltrate or discoid atelectasis.





ED Course/Re-evaluation


ED Course


Patient is a 53-year-old female here with complaints of shortness breath, 


lightheadedness, general malaise. Patient has a history significant for 


diabetes, recent cardiorespiratory failure in the emergency department 2 months 


prior with subsequent rehabilitation which she got home from the past several 


days. Patient was found to be hyperkalemic with no EKG changes, worsening renal 


function and an elevated lactate. Due to the patient's recent history, blood 


cultures were collected, the patient was given empiric antimicrobial therapy and


central venous access was obtained through a right femoral approach. Patient was


given IV fluid resuscitation, insulin and dextrose and subsequent boluses of 


calcium for hyperkalemic management an IV dose of Lasix. Patient remained 


hemodynamically stable throughout course. I discussed the patient with Dr. Rodriguez


who accepted the patient to the hospitalist service for further evaluation and 


care.


Procedure


Central venous access. Patient consent was obtained. Timeout was called prior to


procedure. A right femoral vein access was determined to be most suitable access


site. A triple lumen 7 [place] central venous catheter was introduced into the 


right femoral vein using sterile technique under ultrasound guidance. A total of


one attempt was used prior to obtaining access. Hemostasis was achieved, I 


confirmed that the catheter was in the pain ultrasound visualization and 


nonpulsatile blood flow. Patient tolerated the procedure well.


Decision to Disposition Date:  2019


Decision to Disposition Time:  14:02





Depart


Departure


Latest Vital Signs





Vital Signs








  Date Time  Temp Pulse Resp B/P (MAP) Pulse Ox O2 Delivery O2 Flow Rate FiO2


 


19 14:25  80 9  98   


 


19 14:00    127/88 (101)    


 


19 10:22       2.0 


 


19 10:11 97.8     Nasal Cannula  








Core Temperature (Celsius):  35.7


Impression:  


   Primary Impression:  


   Hyperkalemia


   Additional Impression:  


   Dyspnea


Condition:  Improved


Disposition:  Admitted from ER


Referrals:  


JEN SAMPSON MD (PCP)





Problem Qualifiers











VEDA SUE DO           2019 10:11

## 2019-02-25 NOTE — HISTORY & PHYSICAL
History of Present Illness


Chief Complaint


Chills and malaise


History of Present Illness


This patient presented to the emergency room complaining of chills and malaise. 


She suffered cardiac arrest secondary to a pulmonary embolism.  She was chirinos


sferred to [facility] and spent approximately 13 days as an 


inpatient.  She was discharged on warfarin, but has been subtherapeutic on her 


dosing.  She began to experience similar symptoms today and came to the Er for 


evaluation.





History


Problems:  


(1) Insulin-requiring or dependent type II diabetes mellitus


Status:  Chronic


(2) Hypertension


Status:  Chronic


(3) Cardiac arrest with successful resuscitation


Status:  Chronic


(4) Right ovarian cyst


Status:  Resolved


(5) Cholelithiasis


Status:  Resolved


(6) Ventral hernia


Status:  Resolved


(7) Intra-abdominal adhesions


Status:  Resolved


(8) S/P recurrent ventral herniorrhaphy


Status:  Chronic


(9) Obesity


Status:  Chronic


Home Meds


Active Scripts


Warfarin Sodium (COUMADIN) 1 Mg Tablet, 0.5 MG PO QDAY, #30 TAB


   Prov:JEN SAMPSON MD         2/22/19


Insulin Lispro 100 Un/Ml Pen (HUMALOG 3 ML PEN) 100 Unit/1 Ml Insuln.pen, 2-4 


UNIT SQ QHS, #1 BOX 11 Refills


   Bedtime insulin Per sliding scale QHS


   BG =no insulin


   -250=2 units


   -300= 3 units


   BG >300= 4 units and call MD


   Prov:JEN SAMPSON MD         2/22/19


Lisinopril (LISINOPRIL) 20 Mg Tablet, 1 TAB PO QDAY, #90 TAB 3 Refills


   Prov:JEN SAMPSON MD         1/2/19


Needles, Insulin Disposable (PEN NEEDLES) 1 Each Dis.needle, EA MC DAILY, #3 3 


Refills


   Prov:JEN SAMPSON MD         11/1/18


Flash Glucose Sensor (Freestyle Kody Sensor) 1 Each Kit, EACH SUBQ DAILY, #1


   Prov:JEN SAMPSON MD         10/3/18


Flash Glucose Sc[facility] (Freestyle [place]ody [facility]) 1 Each Each, EACH 


ASDIRECTED, #1


   Prov:JEN SAMPSON MD         10/3/18


Fluticasone Prop 50 Mcg Ns (FLONASE 50 MCG NS) 16 Gm Spray.susp, 2 SPRAYS NS 


QDAY, #1 BOT 3 Refills


   Prov:JEN SAMPSON MD         10/2/18


Reported Medications


Pantoprazole Sodium (PANTOPRAZOLE SODIUM) 40 Mg Tablet.dr, 40 MG PO QDAY, TAB.SR


   2/22/19


Warfarin Sodium (WARFARIN SODIUM) 4 Mg Tablet, 4 MG PO QDAY


   2/22/19


Potassium Chloride (KLOR-CON M20) 20 Meq Tab.er.prt, 20 MEQ PO TID


   2/22/19


Metoprolol Tartrate (METOPROLOL TARTRATE) 25 Mg Tablet, 1 TAB PO BID, TAB


   2/22/19


Insulin Lispro 100 Un/Ml Pen (HUMALOG 3 ML PEN) 100 Unit/1 Ml Insuln.pen, 2-12 


UNIT SQ TID, DIS.SYR


   1 unit per 15 grams of carbohydrates.


   Sliding scale for mealtime insulin:


   BG =no insulin


   -200= 2 units


   -250=4 units


   -300= 6 units


   -350= 8 units


   -400= 10 units


   BG >400= 12 units and call MD


   2/22/19


Furosemide (FUROSEMIDE) 40 Mg Tablet, 1 TAB PO QAM, TAB


   2/22/19


Insulin Glargine 100 Un/Ml Pen (LANTUS SOLOSTAR PEN) 100 Unit/1 Ml Insuln.pen, 


10 UNITS SQ QHS, PEN


   2/22/19


Cholecalciferol (Vitamin D3) (VITAMIN D-3) 2,000 Unit Capsule, 2000 UNIT PO 


QDAY, CAPSULE


   3/14/17


Discontinued Reported Medications


Aspirin (Aspirin) 81 Mg Tablet.dr, 1 TAB PO QDAY, 0 Refills


   2/4/12


Discontinued Scripts


Docusate Sodium (DOCUSATE SODIUM) 100 Mg Capsule, 1 CAP PO BID, #30 CAPSULE 0 


Refills


   Prov:ULLRICH,JOHN A MD         1/11/19


Oxycodone/Acetaminophen (OXYCODONE/ACETAMINOPHEN 5MG/325 MG) 5 Mg/325 Mg Tab, 1 


TAB PO Q4H PRN for MODERATE PAIN, #30 TAB 0 Refills


   Prov:ULLRICH,JOHN A MD         1/11/19


Empagliflozin (Jardiance) 10 Mg Tablet, 1 TAB PO DAILY, #90 TAB 3 Refills


   Prov:JEN SAMPSON MD         10/29/18


Dulaglutide (Trulicity) 1.5 Mg/0.5 Ml Pen.injctr, 1.5 MG SQ weekly, #4 MISC 11 


Refills


   Prov:JEN SAMPSON MD         8/8/18


Rosuvastatin Calcium (CRESTOR) 40 Mg Tablet, 1 TAB PO QDAY, #90 TAB 3 Refills


   Prov:JEN SAMPSON MD         3/21/18


Insulin Degludec (Tresiba Flextouch U-100) 100 Unit/Ml (3 Ml) Insuln.pen, 96 


UNITS SUBQ QAM, #2 BOX 11 Refills


   Prov:JEN SAMPSON MD         3/5/18


Metformin Hcl (METFORMIN HCL) 1,000 Mg Tablet, 1 TAB PO BID, #180 TAB 3 Refills


   Prov:JEN SAMPSON MD         12/12/17


Hydrochlorothiazide (HYDROCHLOROTHIAZIDE) 12.5 Mg Tablet, 1 TAB PO QDAY, #90 TAB


4 Refills


   Prov:JEN SAMPSON MD         11/30/17


Allergies:  


Coded Allergies:  


     No Known Drug Allergies (Verified , 12/27/18)


Patient History:  


FH: HTN (hypertension)


  MOTHER


  BROTHER OR SISTER


FH: diabetes mellitus


  MOTHER


FH: hypercholesterolemia


  BROTHER OR SISTER


FHx: heart failure


  BROTHER OR SISTER


Hx Smoking:  No


Smoking Status:  Never Smoker


Exposure to Second Hand Smoke?:  Yes (CHILDHOOD AND CURRENTLY)


Caffeine Intake:  Soda


Caffeine/Cups Per Day:  2 sodas/day


Hx Alcohol Use:  No


Hx Substance Use Disorder:  No


Social Drug Use:  Never





Review of Systems


All Systems Reviewed/Normal:  Yes, Except as Noted


Constitutional:  Chills


Neurological:  Weakness





Exam


Vital Signs





Vital Signs








  Date Time  Temp Pulse Resp B/P (MAP) Pulse Ox O2 Delivery O2 Flow Rate FiO2


 


2/25/19 15:42     84   


 


2/25/19 15:22 97.5 80 12 102/73 (83)  Nasal Cannula 2.0 








Neuro:  No Gross deficits


Eyes:  PERRLA


Cardiovascular:  Regular Rate and Rhythm


Respiratory:  Clear to Auscultation


GI:  Abd Soft and Non-Tender


Extremities:  No Edema


Integumentary:  No Cyanosis





Medical Decision Making


Data Points


Result Diagram:  


2/25/19 1117                                                                    


           2/25/19 1349








Assessment and Plan


Problems:  


(1) Acute renal failure


Assessment & Plan:  She does have an elevated creatine.  She has been started on


IV fluids and a repeat chemistry panel is ordered for later this evening.





(2) Hyperkalemia


Status:  Acute


Assessment & Plan:  She received a dose of IV insulin in the emergency 


department, but her levels have not changed.  We will repeat a dose of IV 


insulin with dextrose and will administer a dose of Kayexalate.  A repeat chem


istry is ordered for later this evening.





(3) Hx pulmonary embolism


Assessment & Plan:  She did suffer a pulmonary embolism approximately one month 


ago.  She also arrested during that admission.  She has been on warfarin, but 


her levels are subtherapeutic.  She has been placed on full dose Lovenox and her


warfarin dose has been increased to 5mg daily.  Consideration may be given to a 


repeat CT chest once her renal function improves.





(4) Insulin-requiring or dependent type II diabetes mellitus


Status:  Chronic


Assessment & Plan:  She is on chronic treatment with Lantus and Humalog.  We cu


rrently have her on sliding scale level #2 only.





Central Venous Access


Medical Necessity for Access:  Hemodynamic Monitoring, IV Access, Medication 


Administration


Copies to:   JEN SAMPSON MD ;





Venous Thromboembolism


Antithrombotics


Is Pt On Any Antithrombotics?:  Yes





Exam


Sepsis Risk:  No Definite Risk











MARS DE LUNA DO                  Feb 25, 2019 16:06

## 2019-02-26 VITALS — DIASTOLIC BLOOD PRESSURE: 53 MMHG | SYSTOLIC BLOOD PRESSURE: 88 MMHG

## 2019-02-26 VITALS — SYSTOLIC BLOOD PRESSURE: 90 MMHG | DIASTOLIC BLOOD PRESSURE: 47 MMHG

## 2019-02-26 VITALS — SYSTOLIC BLOOD PRESSURE: 87 MMHG | DIASTOLIC BLOOD PRESSURE: 57 MMHG

## 2019-02-26 VITALS — SYSTOLIC BLOOD PRESSURE: 80 MMHG | DIASTOLIC BLOOD PRESSURE: 49 MMHG

## 2019-02-26 VITALS — DIASTOLIC BLOOD PRESSURE: 54 MMHG | SYSTOLIC BLOOD PRESSURE: 92 MMHG

## 2019-02-26 VITALS — DIASTOLIC BLOOD PRESSURE: 47 MMHG | SYSTOLIC BLOOD PRESSURE: 80 MMHG

## 2019-02-26 VITALS — DIASTOLIC BLOOD PRESSURE: 52 MMHG | SYSTOLIC BLOOD PRESSURE: 83 MMHG

## 2019-02-26 VITALS — DIASTOLIC BLOOD PRESSURE: 27 MMHG | SYSTOLIC BLOOD PRESSURE: 89 MMHG

## 2019-02-26 VITALS — SYSTOLIC BLOOD PRESSURE: 94 MMHG | DIASTOLIC BLOOD PRESSURE: 65 MMHG

## 2019-02-26 VITALS — DIASTOLIC BLOOD PRESSURE: 63 MMHG | SYSTOLIC BLOOD PRESSURE: 93 MMHG

## 2019-02-26 VITALS — SYSTOLIC BLOOD PRESSURE: 111 MMHG | DIASTOLIC BLOOD PRESSURE: 67 MMHG

## 2019-02-26 VITALS — SYSTOLIC BLOOD PRESSURE: 79 MMHG | DIASTOLIC BLOOD PRESSURE: 55 MMHG

## 2019-02-26 VITALS — DIASTOLIC BLOOD PRESSURE: 64 MMHG | SYSTOLIC BLOOD PRESSURE: 102 MMHG

## 2019-02-26 VITALS — DIASTOLIC BLOOD PRESSURE: 51 MMHG | SYSTOLIC BLOOD PRESSURE: 87 MMHG

## 2019-02-26 VITALS — SYSTOLIC BLOOD PRESSURE: 93 MMHG | DIASTOLIC BLOOD PRESSURE: 57 MMHG

## 2019-02-26 VITALS — SYSTOLIC BLOOD PRESSURE: 95 MMHG | DIASTOLIC BLOOD PRESSURE: 53 MMHG

## 2019-02-26 VITALS — DIASTOLIC BLOOD PRESSURE: 59 MMHG | SYSTOLIC BLOOD PRESSURE: 98 MMHG

## 2019-02-26 VITALS — DIASTOLIC BLOOD PRESSURE: 54 MMHG | SYSTOLIC BLOOD PRESSURE: 81 MMHG

## 2019-02-26 VITALS — SYSTOLIC BLOOD PRESSURE: 85 MMHG | DIASTOLIC BLOOD PRESSURE: 52 MMHG

## 2019-02-26 VITALS — DIASTOLIC BLOOD PRESSURE: 51 MMHG | SYSTOLIC BLOOD PRESSURE: 92 MMHG

## 2019-02-26 VITALS — DIASTOLIC BLOOD PRESSURE: 53 MMHG | SYSTOLIC BLOOD PRESSURE: 80 MMHG

## 2019-02-26 VITALS — DIASTOLIC BLOOD PRESSURE: 44 MMHG | SYSTOLIC BLOOD PRESSURE: 68 MMHG

## 2019-02-26 VITALS — SYSTOLIC BLOOD PRESSURE: 90 MMHG | DIASTOLIC BLOOD PRESSURE: 53 MMHG

## 2019-02-26 VITALS — SYSTOLIC BLOOD PRESSURE: 84 MMHG | DIASTOLIC BLOOD PRESSURE: 53 MMHG

## 2019-02-26 VITALS — SYSTOLIC BLOOD PRESSURE: 89 MMHG | DIASTOLIC BLOOD PRESSURE: 64 MMHG

## 2019-02-26 VITALS — SYSTOLIC BLOOD PRESSURE: 80 MMHG | DIASTOLIC BLOOD PRESSURE: 45 MMHG

## 2019-02-26 VITALS — SYSTOLIC BLOOD PRESSURE: 85 MMHG | DIASTOLIC BLOOD PRESSURE: 49 MMHG

## 2019-02-26 VITALS — DIASTOLIC BLOOD PRESSURE: 63 MMHG | SYSTOLIC BLOOD PRESSURE: 109 MMHG

## 2019-02-26 VITALS — DIASTOLIC BLOOD PRESSURE: 55 MMHG | SYSTOLIC BLOOD PRESSURE: 101 MMHG

## 2019-02-26 VITALS — DIASTOLIC BLOOD PRESSURE: 53 MMHG | SYSTOLIC BLOOD PRESSURE: 78 MMHG

## 2019-02-26 VITALS — SYSTOLIC BLOOD PRESSURE: 83 MMHG | DIASTOLIC BLOOD PRESSURE: 47 MMHG

## 2019-02-26 VITALS — SYSTOLIC BLOOD PRESSURE: 99 MMHG | DIASTOLIC BLOOD PRESSURE: 56 MMHG

## 2019-02-26 VITALS — DIASTOLIC BLOOD PRESSURE: 60 MMHG | SYSTOLIC BLOOD PRESSURE: 89 MMHG

## 2019-02-26 VITALS — DIASTOLIC BLOOD PRESSURE: 60 MMHG | SYSTOLIC BLOOD PRESSURE: 92 MMHG

## 2019-02-26 VITALS — DIASTOLIC BLOOD PRESSURE: 60 MMHG | SYSTOLIC BLOOD PRESSURE: 90 MMHG

## 2019-02-26 VITALS — DIASTOLIC BLOOD PRESSURE: 46 MMHG | SYSTOLIC BLOOD PRESSURE: 76 MMHG

## 2019-02-26 VITALS — DIASTOLIC BLOOD PRESSURE: 51 MMHG | SYSTOLIC BLOOD PRESSURE: 81 MMHG

## 2019-02-26 VITALS — SYSTOLIC BLOOD PRESSURE: 86 MMHG | DIASTOLIC BLOOD PRESSURE: 51 MMHG

## 2019-02-26 VITALS — DIASTOLIC BLOOD PRESSURE: 44 MMHG | SYSTOLIC BLOOD PRESSURE: 72 MMHG

## 2019-02-26 VITALS — DIASTOLIC BLOOD PRESSURE: 52 MMHG | SYSTOLIC BLOOD PRESSURE: 78 MMHG

## 2019-02-26 VITALS — DIASTOLIC BLOOD PRESSURE: 55 MMHG | SYSTOLIC BLOOD PRESSURE: 92 MMHG

## 2019-02-26 VITALS — SYSTOLIC BLOOD PRESSURE: 97 MMHG | DIASTOLIC BLOOD PRESSURE: 56 MMHG

## 2019-02-26 VITALS — DIASTOLIC BLOOD PRESSURE: 51 MMHG | SYSTOLIC BLOOD PRESSURE: 90 MMHG

## 2019-02-26 VITALS — SYSTOLIC BLOOD PRESSURE: 97 MMHG | DIASTOLIC BLOOD PRESSURE: 58 MMHG

## 2019-02-26 VITALS — SYSTOLIC BLOOD PRESSURE: 92 MMHG | DIASTOLIC BLOOD PRESSURE: 52 MMHG

## 2019-02-26 VITALS — DIASTOLIC BLOOD PRESSURE: 50 MMHG | SYSTOLIC BLOOD PRESSURE: 98 MMHG

## 2019-02-26 VITALS — SYSTOLIC BLOOD PRESSURE: 95 MMHG | DIASTOLIC BLOOD PRESSURE: 54 MMHG

## 2019-02-26 VITALS — SYSTOLIC BLOOD PRESSURE: 93 MMHG | DIASTOLIC BLOOD PRESSURE: 55 MMHG

## 2019-02-26 VITALS — SYSTOLIC BLOOD PRESSURE: 93 MMHG | DIASTOLIC BLOOD PRESSURE: 53 MMHG

## 2019-02-26 VITALS — SYSTOLIC BLOOD PRESSURE: 89 MMHG | DIASTOLIC BLOOD PRESSURE: 50 MMHG

## 2019-02-26 VITALS — DIASTOLIC BLOOD PRESSURE: 53 MMHG | SYSTOLIC BLOOD PRESSURE: 86 MMHG

## 2019-02-26 VITALS — SYSTOLIC BLOOD PRESSURE: 102 MMHG | DIASTOLIC BLOOD PRESSURE: 45 MMHG

## 2019-02-26 VITALS — DIASTOLIC BLOOD PRESSURE: 55 MMHG | SYSTOLIC BLOOD PRESSURE: 95 MMHG

## 2019-02-26 VITALS — SYSTOLIC BLOOD PRESSURE: 70 MMHG | DIASTOLIC BLOOD PRESSURE: 42 MMHG

## 2019-02-26 VITALS — DIASTOLIC BLOOD PRESSURE: 51 MMHG | SYSTOLIC BLOOD PRESSURE: 85 MMHG

## 2019-02-26 LAB
INR PPP: 1.51
PLATELET COUNT, AUTOMATED: 115 K/UL (ref 150–450)
PLATELET COUNT, AUTOMATED: 132 K/UL (ref 150–450)

## 2019-02-26 RX ADMIN — INSULIN LISPRO PRN UNIT: 100 INJECTION, SOLUTION INTRAVENOUS; SUBCUTANEOUS at 17:24

## 2019-02-26 RX ADMIN — INSULIN LISPRO PRN UNIT: 100 INJECTION, SOLUTION INTRAVENOUS; SUBCUTANEOUS at 20:46

## 2019-02-26 RX ADMIN — ACETAMINOPHEN PRN MG: 325 TABLET ORAL at 10:04

## 2019-02-26 RX ADMIN — ENOXAPARIN SODIUM SCH MG: 100 INJECTION SUBCUTANEOUS at 09:37

## 2019-02-26 RX ADMIN — HYDROCORTISONE SODIUM SUCCINATE SCH MG: 100 INJECTION, POWDER, FOR SOLUTION INTRAMUSCULAR; INTRAVENOUS at 09:37

## 2019-02-26 RX ADMIN — ACETAMINOPHEN PRN MG: 325 TABLET ORAL at 16:14

## 2019-02-26 RX ADMIN — WARFARIN SODIUM SCH MG: 5 TABLET ORAL at 13:27

## 2019-02-26 RX ADMIN — THIAMINE HYDROCHLORIDE PRN MLS/HR: 100 INJECTION, SOLUTION INTRAMUSCULAR; INTRAVENOUS at 22:30

## 2019-02-26 RX ADMIN — INSULIN LISPRO PRN UNIT: 100 INJECTION, SOLUTION INTRAVENOUS; SUBCUTANEOUS at 13:27

## 2019-02-26 RX ADMIN — THIAMINE HYDROCHLORIDE PRN MLS/HR: 100 INJECTION, SOLUTION INTRAMUSCULAR; INTRAVENOUS at 02:59

## 2019-02-26 RX ADMIN — SODIUM CHLORIDE SCH MLS/HR: 900 INJECTION, SOLUTION INTRAVENOUS at 14:06

## 2019-02-26 RX ADMIN — THIAMINE HYDROCHLORIDE PRN MLS/HR: 100 INJECTION, SOLUTION INTRAMUSCULAR; INTRAVENOUS at 14:06

## 2019-02-26 RX ADMIN — HYDROCORTISONE SODIUM SUCCINATE SCH MG: 100 INJECTION, POWDER, FOR SOLUTION INTRAMUSCULAR; INTRAVENOUS at 16:20

## 2019-02-26 NOTE — RADIOLOGY IMAGING REPORT
FACILITY: [facility] 

 

PATIENT NAME: Yokasta Dowd

: 1965

MR: [id number]

V: [id number]

EXAM DATE: 

ORDERING PHYSICIAN: FELICE RATLIFF

TECHNOLOGIST: 

 

Location: [facility]

Patient: Yokasta Dowd

: 1965

MRN: [id number]

Visit/Account:[id number]

Date of Sevice:  2019

 

ACCESSION #: [id number]

 

[facility]

 

EXAMINATION: Evaluate kidneys and IVC

 

History: Acute renal failure

 

COMPARISON STUDIES:   CT abdomen and pelvis without contrast performed today

 

FINDINGS:

The right kidney measures 10 x 3.9 x 4.6 cm.  There is no evidence of cortical thinning on the right.


 

The left kidney measures 11.3 x 6.5 x 6.4 cm.  There is no evidence of cortical thinning on the left.


 

 No evidence of hydronephrosis. cm

 

 

The IVC appeared patent.  IVC filter is present.  The renal veins appear patent bilaterally.

 

IMPRESSION:

IVC and renal veins appear patent bilaterally

 

Report Dictated By: Karmen Figueroa MD at 2019 1:48 PM

 

Report E-Signed By: Karmen Figueroa MD  at 2019 1:52 PM

 

WSN:[id number]

## 2019-02-26 NOTE — RADIOLOGY IMAGING REPORT
FACILITY: [facility] [place] 

 

PATIENT NAME: Yokasta Dowd

: 1965

MR: [id number]

V: [id number]

EXAM DATE: 

ORDERING PHYSICIAN: FELICE RATLIFF

TECHNOLOGIST: 

 

Location: [facility]

Patient: Yokasta Dowd

: 1965

MRN: [id number]

Visit/Account:[id number]

Date of Sevice:  2019

 

ACCESSION #: [id number]

 

CT ABDOMEN PELVIS W/O CON

 

HISTORY:  back pain, hypotension

 

TECHNIQUE: Axial images acquired through the abdomen/pelvis.  Coronal and sagittal reformatting also 
performed.  No IV contrast administered.Dose Lowering Technique

 

One of the following dose optimization techniques was utilized in the performance of this exam: Autom
ated exposure control; adjustment of the mA and/or kV according to the patient's size; or use of an i
terative  reconstruction technique.  Specific details can be referenced in the facility's radiology C
T exam operational policy.

 

COMPARISON:  2019 and 2017

 

FINDINGS:

 

Visualized lung bases:  There has been partial resolution of the airspace consolidation in the lower 
lobes with some coarse linear stranding remaining which may represent residual atelectasis.  .

 

Hepatobiliary:  There postsurgical changes from a cholecystectomy

 

Spleen:  Negative.

 

Adrenals:  2.7 x 2.5 cm left adrenal mass appears relatively unchanged in size although appears less 
dense when compared to the prior study.  The right adrenal gland appears unremarkable

 

Pancreas:  Pancreas appears morphologically normal.  The previously noted retroperitoneal fat strandi
ng has resolved

 

Kidneys ureters and bladder: Kidneys appear grossly unremarkable within the limitations of a noncontr
ast study.  There is moderate thickening of the bladder wall with fat stranding seen surrounding the 
bladder

 

Genitalia:  Hysterectomy

 

GI:  There are surgical clips adjacent to the cecum.  There is a small hiatal hernia.  There is no ev
idence of bowel obstruction.

 

Vessels/spaces/nodes:  There is a vena cava umbrella new since the prior study with the proximal port
ion projecting just below the level the renal veins.  There is a right femoral catheter in place in t
he left inguinal region there is now a polylobular hypodense space-occupying process measuring approx
imately 4.3 x 2.9 x 5.8 cm not present previously.  This may represent hematoma.  Less likely adenopa
thy due to the relatively acute interval appearance.  There also appears to be a hematoma now present
 within the left pectineus muscle which is incompletely imaged.

 

There is a trace amount of free pelvic fluid.

 

Bones/soft tissues:  There are multiple nodular densities seen within the left breast along the later
al inferior border which appear more prominent when compared to 2019.  Additional small n
odular densities also seen along the medial inferior right breast.  Correlation with mammography mathew
mmended

 

Again noted are post surgical changes from ventral hernia repair.  Fluid and a small amount of air ag
ain noted in the subcutaneous soft tissues just ventral to the hernia repair appears slightly less pr
ominent when compared to the prior study now measuring 5 x 2.3 cm as opposed to 6.7 x 3.1 cm and may 
be postoperative in nature

 

No aggressive appearing bone lesions are seen

 

Additional findings:  None pertinent.

 

IMPRESSION:

 

There is some partial resolution of airspace consolidation lower lobes with some coarse linear strand
ing remaining.  This may represent residual atelectasis

 

Post surgical changes from a cholecystectomy hysterectomy and ventral hernia repair.  The fluid colle
ction in the subcutaneous soft tissues just anterior to the ventral hernia repair has partially decre
ased in size

 

2.7 x 2.5 cm left adrenal mass appears relatively unchanged in size although appears less dense when 
compared the prior study.  As previously noted this could be related to left adrenal hemorrhage altho
ugh follow-up recommended to exclude a mass lesion.

 

Moderate thickening of the bladder wall with fat stranding surrounding the bladder which may be relat
ed to i fectious/inflammatory process.  Clinical correlation needed

 

Small hiatal hernia.This a polylobular soft tissue density space-occupying process left inguinal everett
on as described above.  This was not present previously and may represent hematoma.  Adenopathy seems
 less likely due to the relatively acute appearance..

 

There also appears to be a new hematoma in the left pectineus muscle.

 

There are multiple nodular densities within the inferior left breast as described above.  Correlation
 with mammography recommended

 

 

 

Report Dictated By: Karmen Figueroa MD at 2019 10:28 AM

 

Report E-Signed By: Karmen Figueroa MD  at 2019 10:56 AM

 

WSN:[id number]

## 2019-02-26 NOTE — HOSPITALIST PROGRESS NOTE
Subjective


Progress Notes


Subjective


She reports that she no longer feels dizzy.  She hasn't had any UOP and BP have 


been low normal.





Physical Exam





Vital Signs








  Date Time  Temp Pulse Resp B/P (MAP) Pulse Ox O2 Delivery O2 Flow Rate FiO2


 


2/26/19 11:00   14 86/53 (64) 93 Nasal Cannula 1.0 


 


2/26/19 09:30  99      


 


2/26/19 08:26 99.0       














Intake and Output 


 


 2/26/19





 07:00


 


Intake Total 2852 ml


 


Balance 2852 ml


 


 


 


Intake Oral 360 ml


 


IV Total 2492 ml


 


# Voids 2


 


# Bowel Movements 2








General Appearance:  Alert, Awake, No Acute Distress


Cardiovascular:  Other (borderline tachycardic.  No m/r/g)


Respiratory:  Clear to Auscultation


GI:  Soft and Non-Tender


Extremities:  Edema (trace pitting in shins bilaterally)


Integumentary:  No Jaundice, No Cyanosis


Result Diagram:  


2/26/19 0907 2/26/19 0907








Assessment and Plan


Problems:  


(1) Hypotension


Status:  Acute


Assessment & Plan:  She presented with chills, malaise, SOB, dizziness and 


seeing white the day of admission.  She was found to have an elevated lactate 


and hypotensive.  She has been given and IV, hydrocortisone.  The source of the 


hypotension isn't clear.  Abd CT showed a lobular region in the left groin 


consistent with a hematoma and post surgical changes from a ventral hernia 


repair.  Will restart abx with Rocephin and doxycycline because of the CXR 


finding and complaint of SOB.  Getting an abdominal US to evaluate the IVC 


filter.  She will be transferred to the ICU.





(2) Acute renal failure


Assessment & Plan:  She does have an elevated creatine of unclear etiology, but 


certainly exacerbated by furosemide and lisinopril use (which have been held).  


She has been started on IV fluids and steroids.   Arguello to be placed.  Follow 


BMP.





(3) Hyperkalemia


Status:  Acute


Assessment & Plan:  Secondary to ARF and exacerbated by lisinopril and KCL use. 


She received a dose of IV insulin in the emergency department.  She was then 


given Kayexalate.  Potassium improving on recheck.





(4) Hx pulmonary embolism


Assessment & Plan:  She did suffer a pulmonary embolism approximately one month 


ago.  She also arrested during that admission.  IVC filter was placed at that 


time.  She has been on warfarin, but her levels are subtherapeutic.  She has 


been placed on full dose Lovenox and her warfarin dose has been increased to 5mg


daily.  Consideration may be given to a repeat CT chest once her renal function 


improves.





(5) Insulin-requiring or dependent type II diabetes mellitus


Status:  Chronic


Assessment & Plan:  She is on chronic treatment with Lantus and Humalog.  We 


currently have her on sliding scale level #2 only.





Central Venous Access


Medical Necessity for Access:  Hemodynamic Monitoring, IV Access, Medication 


Administration





Exam


Sepsis Risk:  No Definite Risk











FELICE RATLIFF MD               Feb 26, 2019 12:12

## 2019-02-26 NOTE — NUR
Kaexalate was scanned originally. Labs checked - K 5.5. Nurse asks Dr. Diego if to give 
or hold me. MD orders to not give med. Med not given.

## 2019-02-27 VITALS — SYSTOLIC BLOOD PRESSURE: 128 MMHG | DIASTOLIC BLOOD PRESSURE: 71 MMHG

## 2019-02-27 VITALS — DIASTOLIC BLOOD PRESSURE: 53 MMHG | SYSTOLIC BLOOD PRESSURE: 86 MMHG

## 2019-02-27 VITALS — SYSTOLIC BLOOD PRESSURE: 130 MMHG | DIASTOLIC BLOOD PRESSURE: 73 MMHG

## 2019-02-27 VITALS — SYSTOLIC BLOOD PRESSURE: 113 MMHG | DIASTOLIC BLOOD PRESSURE: 65 MMHG

## 2019-02-27 VITALS — DIASTOLIC BLOOD PRESSURE: 64 MMHG | SYSTOLIC BLOOD PRESSURE: 117 MMHG

## 2019-02-27 VITALS — SYSTOLIC BLOOD PRESSURE: 144 MMHG | DIASTOLIC BLOOD PRESSURE: 76 MMHG

## 2019-02-27 VITALS — DIASTOLIC BLOOD PRESSURE: 76 MMHG | SYSTOLIC BLOOD PRESSURE: 135 MMHG

## 2019-02-27 VITALS — DIASTOLIC BLOOD PRESSURE: 56 MMHG | SYSTOLIC BLOOD PRESSURE: 97 MMHG

## 2019-02-27 VITALS — SYSTOLIC BLOOD PRESSURE: 121 MMHG | DIASTOLIC BLOOD PRESSURE: 64 MMHG

## 2019-02-27 VITALS — DIASTOLIC BLOOD PRESSURE: 69 MMHG | SYSTOLIC BLOOD PRESSURE: 122 MMHG

## 2019-02-27 VITALS — DIASTOLIC BLOOD PRESSURE: 67 MMHG | SYSTOLIC BLOOD PRESSURE: 120 MMHG

## 2019-02-27 VITALS — SYSTOLIC BLOOD PRESSURE: 90 MMHG | DIASTOLIC BLOOD PRESSURE: 55 MMHG

## 2019-02-27 VITALS — SYSTOLIC BLOOD PRESSURE: 119 MMHG | DIASTOLIC BLOOD PRESSURE: 63 MMHG

## 2019-02-27 VITALS — SYSTOLIC BLOOD PRESSURE: 134 MMHG | DIASTOLIC BLOOD PRESSURE: 67 MMHG

## 2019-02-27 VITALS — DIASTOLIC BLOOD PRESSURE: 64 MMHG | SYSTOLIC BLOOD PRESSURE: 113 MMHG

## 2019-02-27 VITALS — SYSTOLIC BLOOD PRESSURE: 114 MMHG | DIASTOLIC BLOOD PRESSURE: 64 MMHG

## 2019-02-27 LAB
INR PPP: 1.5
PLATELET COUNT, AUTOMATED: 134 K/UL (ref 150–450)

## 2019-02-27 RX ADMIN — INSULIN LISPRO PRN UNIT: 100 INJECTION, SOLUTION INTRAVENOUS; SUBCUTANEOUS at 08:35

## 2019-02-27 RX ADMIN — ACETAMINOPHEN PRN MG: 325 TABLET ORAL at 13:07

## 2019-02-27 RX ADMIN — ACETAMINOPHEN PRN MG: 325 TABLET ORAL at 20:10

## 2019-02-27 RX ADMIN — HYDROCORTISONE SODIUM SUCCINATE SCH MG: 100 INJECTION, POWDER, FOR SOLUTION INTRAMUSCULAR; INTRAVENOUS at 09:44

## 2019-02-27 RX ADMIN — SODIUM CHLORIDE SCH MLS/HR: 900 INJECTION, SOLUTION INTRAVENOUS at 01:22

## 2019-02-27 RX ADMIN — ENOXAPARIN SODIUM SCH MG: 100 INJECTION SUBCUTANEOUS at 22:21

## 2019-02-27 RX ADMIN — ENOXAPARIN SODIUM SCH MG: 100 INJECTION SUBCUTANEOUS at 10:41

## 2019-02-27 RX ADMIN — INSULIN LISPRO PRN UNIT: 100 INJECTION, SOLUTION INTRAVENOUS; SUBCUTANEOUS at 22:22

## 2019-02-27 RX ADMIN — HYDROCORTISONE SODIUM SUCCINATE SCH MG: 100 INJECTION, POWDER, FOR SOLUTION INTRAMUSCULAR; INTRAVENOUS at 01:21

## 2019-02-27 RX ADMIN — SODIUM CHLORIDE SCH MLS/HR: 900 INJECTION, SOLUTION INTRAVENOUS at 13:31

## 2019-02-27 RX ADMIN — INSULIN LISPRO PRN UNIT: 100 INJECTION, SOLUTION INTRAVENOUS; SUBCUTANEOUS at 17:21

## 2019-02-27 RX ADMIN — HYDROCORTISONE SODIUM SUCCINATE SCH MG: 100 INJECTION, POWDER, FOR SOLUTION INTRAMUSCULAR; INTRAVENOUS at 17:13

## 2019-02-27 RX ADMIN — INSULIN LISPRO PRN UNIT: 100 INJECTION, SOLUTION INTRAVENOUS; SUBCUTANEOUS at 11:49

## 2019-02-27 NOTE — MEDICAL NUTRITION THERAPY
Nutrition Anthropometrics


Height (Inches):  65.00


Height (Calculated Centimeters:  165.170842


Weight (Pounds):  173


Weight (Calculated Kilograms):  78.471


BMI:  28.8


Neil Nutrition Score:         Adequate 


Neil Nutrition Risk Score:  21


Dietary Referral


Nutrition Risk Factors:      


Nutrition Risk Comment:





Nutritional Diagnosis


Nutritional Risk Acuity 1:  Acute/ES Renal


Nutritional Risk Acuity 4:  Good Appetite, Modified Diet


Past Medical History:  


Hx of DMT2, HTN, obesity, S/P recurrent ventral herniorrhaphy, intra  


abdominal adhesions, ventral hernia, cholelithiasis, cardiac arrest w/  


successful resuscitation, right ovarian cyst.


Nutritional Acuity:  1-High


Nutrition Diagnosis:  Decreased Nutrient Needs


Nutrition Etiology:  Physiological Causes


Nutrition Problem/Etiology/Sym:  


Decreased protein, na, fluid needs rt dx ARF AEB , creatinine 1.9


Energy Requirement:  1800 (M-StJ)


Protein Requirement:  62 (.8gm/kg)


Fluid Requirement:  1800 (1ml/kg)


Diet Type:  Diabetic


Nutrition Intervention:  Cont diet as ordered, Encourage intake


Drug:  Warfarin


Do Not Serve Any of the Follow:  Broccoli, Brussel Sprouts, Spinach, Leaf 


Lettuce, Cranberry Juice


Diet Comment To RSA:  


PT IS ON WARFARIN





Nutrition Monitoring & Eval


Nutrition Goals:  Eat % Meal


Nutrition Follow-Up:  Good Intake


RD Patient Assessment Time:  30 minutes


RD Assessment Type:  RD Assessment


Patient Nutrition Acuity:  1-High


Follow Up Date:  Mar 1, 2019


Nutritional Comment:  


2/26: Pt admitted for chills and malaise,with ARF. Pt has a Hx of DMT2,  


HTN, obesity, S/P recurrent ventral herniorrhaphy, intra abdominal  


adhesions, ventral hernia,cholelithiasis, cardiac arrest w/ successful  


resuscitation, right ovarian cyst. Pt was dx with ARF, hyperkalemia, PE,  


and DMT2. Pt has elevated BUN, creatinine, WBG and RBG levels. Pt is  


consuming 100% MICHAEL.-JJ





2/27 Pt cont diabetic diet.  Eating % meals.  BUN/creatinine cont


elevated at 25/1.9 but has improved.  BG elevated up to 296.  alb 3.3.


Hgb/hct 8.8/26.9.  K+ has declined and now WRN at 4.5.  Will cont to


monitor and encourage intake. FISH SMITH                    Feb 27, 2019 09:27

## 2019-02-27 NOTE — ANTIMICROBIAL STEWARDSHIP
Antimicrobial Stewardship


Empiricly appropriate:  Yes (CAP- per chest xray)


Significant PMH:  Yes (DM2, Sepsis history, HTN, HL, S/P cardiac arrest 


secondary to PE, PE)


Support empiric regimen:  Yes (Ceftriaxone, Doxycycline)


Approriate Cultures done:  Yes (Blood Cx NGTD)


Renal/Hepatic dosing:  Yes (CrCl- 35 ml/min)


Comment


Lovenox interval changed, CrCl~35ml/min, change to Q12H


Reviewed for Drug Interaction:  Yes


IV to PO Opportunity:  No


Determine cumulative duration:  Today is Day 2


Determine standard duration:  5-7 days


Comment


52 yo F with a PMH of DM2, Sepsis, HTN, HL, s/p cardiac arrest secondary to PE, 


PE, has an IVC filter on warfarin who presented to the ED with chills, malaise, 


SOB, fever.





Tmax afebrile


WBC 9.7


K 7.3, now down to 4.5


Scr 2.3 -->3.1-->1.9


Influenza A/B (-)


UA was contaminated (asymptomatic)


Chest xray:  HETAL infiltrate


Blood Cx x1 - NGTD


INR on admission was 1.25, now 1.5


CrCl ~35mL/min





Plan continue full course of antibiotics for possible pneumonia with ceftriaxone


and doxycycline to complete 5-7 days of therapy.  INR subtherapeutic (goal = 2-


3) for PE, on Lovenox 1 mg/kg SC Q12H (CrCl now 35 ml/min).  Will continue to 


monitor, watch INR for interactions with antibiotics.  Increased warfarin to 6mg


daily.  





Bianca Alaniz, PharmD, BCOP











BIANCA ALANIZ               Feb 27, 2019 09:13

## 2019-02-27 NOTE — HOSPITALIST PROGRESS NOTE
Subjective


Progress Notes


Subjective


She denies any complaints this AM. Appetite is good. She would like to start 


mobilizing.





Physical Exam





Vital Signs








  Date Time  Temp Pulse Resp B/P (MAP) Pulse Ox O2 Delivery O2 Flow Rate FiO2


 


2/27/19 07:47     92 Nasal Cannula 1.0 


 


2/27/19 07:45  87      


 


2/27/19 07:00 98.2  17 122/69 (86)    














Intake and Output 


 


 2/27/19





 07:00


 


Intake Total 3745 ml


 


Output Total 1455 ml


 


Balance 2290 ml


 


 


 


Intake Oral 1780 ml


 


IV Total 1965 ml


 


Output Urine Total 1455 ml


 


# Voids 1


 


# Bowel Movements 1








General Appearance:  Alert, Awake


Cardiovascular:  Regular Rate and Rhythm


Respiratory:  Clear to Auscultation


GI:  Soft and Non-Tender


Extremities:  Warm, Perfused, Edema (1+ both feet/ankles)


Psych:  Alert & Oriented X3


Result Diagram:  


2/27/19 0447 2/27/19 0447








Assessment and Plan


Problems:  


(1) Hypotension


Status:  Acute


Assessment & Plan:  She presented with chills, malaise, SOB, dizziness and 


seeing white the day of admission.  She was found to have an elevated lactate 


and hypotensive.  She has been given and IV fluids, IV antibiotics, 


hydrocortisone.  The source of the hypotension is probably related to dehydr


ation/volume depletion.  Abdomen CT showed a lobular region in the left groin 


consistent with a hematoma and post surgical changes from a ventral hernia 


repair.  She is currently on IV Rocephin and doxycycline because of the CXR 


finding. IVC filter appears appropriately placed with flow. She has improved 


with respect to renal function and electrolytes.  She will be transferred out of


the ICU today.





(2) Acute renal failure


Status:  Acute


Assessment & Plan:  Improved. She did have a significantly elevated creatine 


most likely related to dehydration/volume depletion exacerbated by furosemide 


and lisinopril use (which have been stopped).  She has been started on IV fluids


and steroids.   Arguello was placed to monitor urine output, but will be removed 


today as she has improved significantly.  Follow labs.





(3) Hyperkalemia


Status:  Acute


Assessment & Plan:  Resolved. Secondary to ARF and exacerbated by lisinopril and


KCL use. Potassium now in normal range. Monitor labs.





(4) Hx pulmonary embolism


Assessment & Plan:  She did suffer a massive pulmonary embolism approximately 


one month ago.  She also suffered cardiac arrest during that event.  IVC filter 


was placed at that time.  She has been on warfarin, but her levels were 


subtherapeutic at time of admission.  She has been placed on full dose Lovenox 


(renal dosed) and her warfarin dose has been increased to 5mg daily. Will need 


to adjust dose of Lovenox as her renal function improves.





(5) Insulin-requiring or dependent type II diabetes mellitus


Status:  Chronic


Assessment & Plan:  She is on chronic treatment with Lantus and Humalog.  We 


currently have her on sliding scale insulin.





Central Venous Access


Medical Necessity for Access:  Hemodynamic Monitoring, IV Access, Medication 


Administration





Exam


Sepsis Risk:  No Definite Risk











CLYDE OQUENDO MD            Feb 27, 2019 08:39

## 2019-02-28 VITALS — DIASTOLIC BLOOD PRESSURE: 71 MMHG | SYSTOLIC BLOOD PRESSURE: 154 MMHG

## 2019-02-28 VITALS — DIASTOLIC BLOOD PRESSURE: 71 MMHG | SYSTOLIC BLOOD PRESSURE: 140 MMHG

## 2019-02-28 VITALS — SYSTOLIC BLOOD PRESSURE: 152 MMHG | DIASTOLIC BLOOD PRESSURE: 82 MMHG

## 2019-02-28 VITALS — SYSTOLIC BLOOD PRESSURE: 151 MMHG | DIASTOLIC BLOOD PRESSURE: 73 MMHG

## 2019-02-28 LAB
INR PPP: 1.67
PLATELET COUNT, AUTOMATED: 154 K/UL (ref 150–450)

## 2019-02-28 RX ADMIN — ENOXAPARIN SODIUM SCH MG: 100 INJECTION SUBCUTANEOUS at 09:36

## 2019-02-28 RX ADMIN — SODIUM CHLORIDE SCH MLS/HR: 900 INJECTION, SOLUTION INTRAVENOUS at 02:26

## 2019-02-28 RX ADMIN — INSULIN LISPRO PRN UNIT: 100 INJECTION, SOLUTION INTRAVENOUS; SUBCUTANEOUS at 11:41

## 2019-02-28 RX ADMIN — ACETAMINOPHEN PRN MG: 325 TABLET ORAL at 02:28

## 2019-02-28 RX ADMIN — HYDROCORTISONE SODIUM SUCCINATE SCH MG: 100 INJECTION, POWDER, FOR SOLUTION INTRAMUSCULAR; INTRAVENOUS at 00:17

## 2019-02-28 RX ADMIN — HYDROCORTISONE SODIUM SUCCINATE SCH MG: 100 INJECTION, POWDER, FOR SOLUTION INTRAMUSCULAR; INTRAVENOUS at 09:35

## 2019-02-28 RX ADMIN — INSULIN LISPRO PRN UNIT: 100 INJECTION, SOLUTION INTRAVENOUS; SUBCUTANEOUS at 07:59

## 2019-02-28 NOTE — HOSPITALIST DEPART
Discharge Summary


Reason for Hosp/Final Diag:  


(1) Hypotension


Status:  Acute


Hospital Course & Plan:  She was found to have an elevated lactate and was 


hypotensive.  The source of the hypotension is probably related to deh


ydration/volume depletion.  She was placed on IV fluids and stress dose 


hydrocortisone.  Her hypotension improved and the lactic acidosis resolved.  She


will complete a prednisone taper as an outpatient.





(2) Acute renal failure


Status:  Acute


Hospital Course & Plan:  She did have a significantly elevated creatine at 


admission.  This did improve with IV fluids.  Her Lasix and lisinopril were 


discontinued.





(3) Hyperkalemia


Status:  Acute


Hospital Course & Plan:  Her lisinopril and potassium supplements have been 


discontinued.





(4) Hx pulmonary embolism


Hospital Course & Plan:  She did suffer a massive pulmonary embolism appr


oximately one month ago.  She also suffered cardiac arrest during that event. An


IVC filter was placed and she has been on warfarin.  Her levels were 


subtherapeutic at admission.  We did place her on full dose Lovenox and have 


also increased the dose of her warfarin.  She will continue on Lovenox for an 


additional 2 days and will follow up for INR testing on Monday.





(5) Insulin-requiring or dependent type II diabetes mellitus


Status:  Chronic


Hospital Course & Plan:  She is on chronic treatment with Lantus and Humalog.  





(6) Bacterial pneumonia


Hospital Course & Plan:  A CT scan suggested a small infiltrate.  She was 


started on empiric treatment with ceftriaxone and doxycycline.  She will 


complete a course of oral antibiotics.





(7) Breast lesion


Hospital Course & Plan:  Nodules were seen in the left breast on her CT scan.  A


mammogram is recommended.  





(8) Adrenal hemorrhage


Status:  Acute


Hospital Course & Plan:  There was remnant of her previous adrenal hemorrhage 


seen on the CT scan.  A follow up CT scan was recommended.





Departure


Latest Vital Signs





Vital Signs








 2/28/19 2/28/19





 07:11 07:51


 


Temp 98.0 


 


Pulse 77 


 


Resp 16 


 


B/P (MAP) 140/71 (94) 


 


Pulse Ox  94


 


O2 Delivery  Room Air


 


O2 Flow Rate 2.0 








Weight (Pounds):  173


Result Diagram:  


2/28/19 0539                                                                    


           2/28/19 0539





Condition:  Improved


Discharge:  Home, Self Care


Follow-Up Labs:  Finger Sticks, INR





Discharge Instructions


Home Meds


Active Scripts


Enoxaparin Sodium (LOVENOX) 80 Mg/0.8 Ml Disp.syrin, 80 MG SQ BID, #4 DIS.SYR


   Prov:MARS DE LUNA DO         2/28/19


Prednisone 10 Mg Tab (PREDNISONE 10 MG TAB) 10 Mg Tablet, 10 MG PO AS DIRECTED, 


#30 TAB


   Take 4 tab daily x 3 days, then 3 tab daily x 3 days, then 2 tab


   daily x 3 days, then 1 tab daily x 3 days.


   Prov:MARS DE LUNA DO         2/28/19


Doxycycline Hyclate (DOXYCYCLINE HYCLATE) 100 Mg Tablet, 100 MG PO BID, #6 TAB


   Prov:MARS DE LUNA DO         2/28/19


Cefdinir 300 Mg Cap (OMNICEF 300 MG CAP (OR EQUIV)) 300 Mg Cap, 300 MG PO BID, 


#6 CAP


   Prov:MARS DE LUNA DO         2/28/19


Warfarin Sodium (WARFARIN SODIUM) 7.5 Mg Tablet, 7.5 MG PO QDAY, #30 TAB


   Prov:MARS DE LUNA DO         2/28/19


Needles, Insulin Disposable (PEN NEEDLES) 1 Each Dis.needle, EA MC DAILY, #3 3 


Refills


   Prov:JEN SAMPSON MD         11/1/18


Flash Glucose Sensor (Freestyle Kody Sensor) 1 Each Kit, EACH SUBQ DAILY, #1


   Prov:JEN SAMPSON MD         10/3/18


Flash Glucose Scanning [place] (Freestyle Kody [place]) 1 Each Each, EACH 


ASDIRECTED, #1


   Prov:JEN SAMPSON MD         10/3/18


Fluticasone Prop 50 Mcg Ns (FLONASE 50 MCG NS) 16 Gm Spray.susp, 2 SPRAYS NS 


QDAY, #1 BOT 3 Refills


   Prov:JEN SAMPSON MD         10/2/18


Reported Medications


Docusate Sodium (COLACE) 100 Mg Capsule, 100 MG PO BID, CAPSULE


   2/26/19


Metoprolol Tartrate (METOPROLOL TARTRATE) 50 Mg Tab, 1 TAB PO BID, TAB


   2/26/19


Insulin Lispro 100 Un/Ml Pen (HUMALOG 3 ML PEN) 100 Unit/1 Ml Insuln.pen, 1-4 


UNIT SQ QHS, DIS.SYR


   2/26/19


Oxygen (OXYGEN)  Inha, 2 L INH QHS, L


   2/26/19


Pantoprazole Sodium (PANTOPRAZOLE SODIUM) 40 Mg Tablet.dr, 40 MG PO QDAY, TAB.SR


   2/22/19


Insulin Lispro 100 Un/Ml Pen (HUMALOG 3 ML PEN) 100 Unit/1 Ml Insuln.pen, 2-12 


UNIT SQ TID, DIS.SYR


   1 unit per 15 grams of carbohydrates.


   Sliding scale for mealtime insulin:


   BG =no insulin


   -200= 2 units


   -250=4 units


   -300= 6 units


   -350= 8 units


   -400= 10 units


   BG >400= 12 units and call MD


   2/22/19


Insulin Glargine 100 Un/Ml Pen (LANTUS SOLOSTAR PEN) 100 Unit/1 Ml Insuln.pen, 


10 UNITS SQ QHS, PEN


   2/22/19


Cholecalciferol (Vitamin D3) (VITAMIN D-3) 2,000 Unit Capsule, 2000 UNIT PO 


QDAY, CAPSULE


   3/14/17


Discontinued Reported Medications


Warfarin Sodium (WARFARIN SODIUM) 4 Mg Tablet, 4 MG PO QDAY


   2/22/19


Potassium Chloride (KLOR-CON M20) 20 Meq Tab.er.prt, 40 MEQ PO TID


   2/22/19


Furosemide (FUROSEMIDE) 40 Mg Tablet, 1 TAB PO QAM, TAB


   2/22/19


Docusate Sodium (COLACE) 100 Mg Capsule, 100 MG PO BID, CAPSULE


   2/26/19


Metoprolol Tartrate (METOPROLOL TARTRATE) 25 Mg Tablet, 1 TAB PO BID, TAB


   2/22/19


Aspirin (Aspirin) 81 Mg Tablet.dr, 1 TAB PO QDAY, 0 Refills


   2/4/12


Discontinued Scripts


Lisinopril (LISINOPRIL) 20 Mg Tablet, 1 TAB PO QDAY, #90 TAB 3 Refills


   Prov:JEN SAMPSON MD         1/2/19


Warfarin Sodium (COUMADIN) 1 Mg Tablet, 0.5 MG PO QDAY, #30 TAB


   Prov:JEN SAMPSON MD         2/22/19


Insulin Lispro 100 Un/Ml Pen (HUMALOG 3 ML PEN) 100 Unit/1 Ml Insuln.pen, 2-4 


UNIT SQ QHS, #1 BOX 11 Refills


   Bedtime insulin Per sliding scale QHS


   BG =no insulin


   -250=2 units


   -300= 3 units


   BG >300= 4 units and call MD


   Prov:JEN SAMPSON MD         2/22/19


Docusate Sodium (DOCUSATE SODIUM) 100 Mg Capsule, 1 CAP PO BID, #30 CAPSULE 0 


Refills


   Prov:ULLRICH,JOHN A MD         1/11/19


Oxycodone/Acetaminophen (OXYCODONE/ACETAMINOPHEN 5MG/325 MG) 5 Mg/325 Mg Tab, 1 


TAB PO Q4H PRN for MODERATE PAIN, #30 TAB 0 Refills


   Prov:ULLRICH,JOHN A MD         1/11/19


Empagliflozin (Jardiance) 10 Mg Tablet, 1 TAB PO DAILY, #90 TAB 3 Refills


   Prov:JEN SAMPSON MD         10/29/18


Dulaglutide (Trulicity) 1.5 Mg/0.5 Ml Pen.injctr, 1.5 MG SQ weekly, #4 MISC 11 


Refills


   Prov:JEN SAMPSON MD         8/8/18


Rosuvastatin Calcium (CRESTOR) 40 Mg Tablet, 1 TAB PO QDAY, #90 TAB 3 Refills


   Prov:JEN SAMPSON MD         3/21/18


Insulin Degludec (Tresiba Flextouch U-100) 100 Unit/Ml (3 Ml) Insuln.pen, 96 


UNITS SUBQ QAM, #2 BOX 11 Refills


   Prov:JEN SAMPSON MD         3/5/18


Metformin Hcl (METFORMIN HCL) 1,000 Mg Tablet, 1 TAB PO BID, #180 TAB 3 Refills


   Prov:JEN SAMPSON MD         12/12/17


Hydrochlorothiazide (HYDROCHLOROTHIAZIDE) 12.5 Mg Tablet, 1 TAB PO QDAY, #90 TAB


4 Refills


   Prov:JEN SAMPSON MD         11/30/17


Diet:  Diabetic


Activity:  As Tolerated


Copies to:   JEN SAMPSON MD ;





Venous Thromboembolism


Antithrombotics


Is Pt On Any Antithrombotics?:  Yes











MARS DE LUNA DO                  Feb 28, 2019 10:25

## 2019-03-01 ENCOUNTER — HOSPITAL ENCOUNTER (OUTPATIENT)
Dept: HOSPITAL 89 - LAB | Age: 54
End: 2019-03-01
Attending: INTERNAL MEDICINE
Payer: COMMERCIAL

## 2019-03-01 VITALS — BODY MASS INDEX: 28.79 KG/M2

## 2019-03-01 DIAGNOSIS — I10: Primary | ICD-10-CM

## 2019-03-01 LAB — PLATELET COUNT, AUTOMATED: 220 K/UL (ref 150–450)

## 2019-03-01 PROCEDURE — 82310 ASSAY OF CALCIUM: CPT

## 2019-03-01 PROCEDURE — 84295 ASSAY OF SERUM SODIUM: CPT

## 2019-03-01 PROCEDURE — 84132 ASSAY OF SERUM POTASSIUM: CPT

## 2019-03-01 PROCEDURE — 82565 ASSAY OF CREATININE: CPT

## 2019-03-01 PROCEDURE — 36415 COLL VENOUS BLD VENIPUNCTURE: CPT

## 2019-03-01 PROCEDURE — 82435 ASSAY OF BLOOD CHLORIDE: CPT

## 2019-03-01 PROCEDURE — 82947 ASSAY GLUCOSE BLOOD QUANT: CPT

## 2019-03-01 PROCEDURE — 85025 COMPLETE CBC W/AUTO DIFF WBC: CPT

## 2019-03-01 PROCEDURE — 82374 ASSAY BLOOD CARBON DIOXIDE: CPT

## 2019-03-01 PROCEDURE — 84520 ASSAY OF UREA NITROGEN: CPT

## 2019-03-04 ENCOUNTER — HOSPITAL ENCOUNTER (OUTPATIENT)
Dept: HOSPITAL 89 - LAB | Age: 54
End: 2019-03-04
Attending: INTERNAL MEDICINE
Payer: COMMERCIAL

## 2019-03-04 VITALS — BODY MASS INDEX: 28.79 KG/M2

## 2019-03-04 DIAGNOSIS — I10: Primary | ICD-10-CM

## 2019-03-04 PROCEDURE — 82565 ASSAY OF CREATININE: CPT

## 2019-03-04 PROCEDURE — 84520 ASSAY OF UREA NITROGEN: CPT

## 2019-03-04 PROCEDURE — 82310 ASSAY OF CALCIUM: CPT

## 2019-03-04 PROCEDURE — 84132 ASSAY OF SERUM POTASSIUM: CPT

## 2019-03-04 PROCEDURE — 82374 ASSAY BLOOD CARBON DIOXIDE: CPT

## 2019-03-04 PROCEDURE — 84295 ASSAY OF SERUM SODIUM: CPT

## 2019-03-04 PROCEDURE — 82947 ASSAY GLUCOSE BLOOD QUANT: CPT

## 2019-03-04 PROCEDURE — 36415 COLL VENOUS BLD VENIPUNCTURE: CPT

## 2019-03-04 PROCEDURE — 82435 ASSAY OF BLOOD CHLORIDE: CPT

## 2019-03-05 VITALS — DIASTOLIC BLOOD PRESSURE: 88 MMHG | SYSTOLIC BLOOD PRESSURE: 142 MMHG

## 2019-03-05 VITALS — SYSTOLIC BLOOD PRESSURE: 144 MMHG | DIASTOLIC BLOOD PRESSURE: 84 MMHG

## 2019-03-05 NOTE — CARDIAC REHAB PLAN OF CARE
Physician: Nory Herndon MD

Patient is being seen: Martha Jennings

Medical Diagnosis: MI; Cardiac Arrest; PE

Date of Onset:  01/26/19                                                        

Date of Initial Evaluation: 03/05/19



INTERVENTIONS:



Due Date: 04/05/19



Patient Assessment: Patient is a 53yr old female who comes to cardiac  rehab 

following an MI on 01/26/19. Her MI led her to cardiac arrest from  which she 

was revived 5 times. Currently, her physicians are suspecting  her MI to be a 

result from serious PEs. She has a significant cardiac  history, positive for 

T2D, HTN, High Cholesterol, sleep apnea, sedentary  behavior, and obesity (BMI 

32kg/m2). She is a reserved individual, but  expresses interest and motivation 

to be a part of the program. Her goals  are to return to work and her daily 

activities as well as gain confidence  in her ability and lose weight. She will 

attend the 2pm class. 



Exercise Assessment: During her 6-Minute Walk Test, the patient walked for  a 

total of 5:40taking the last 20s to rest her legs. She walked a total  of 

1200ft for an average speed of 2.3mph. Her HR rises significantly with  exercise

(between 124-166bpm) with normal blood pressure response  (152/82). Her SPO2 

remained above 90% during exercise and her RPE ranged  between 3-8. She is 

relatively deconditioned, having not exercised much  outside her daily physical 

activity at work ([employer]) and daily  chores at home.  



Exercise Plan:

Goals: The patients primary goal will be to increase exercise tolerance  and 

endurance by gradually increase duration of exercise. We hope to  achieve 40 

minutes on average per CR session, with additional exercise  (e.g. walking) done

on days off at home. Once duration goals are achieved  we will focus on 

increasing intensity towards 5.0 METs on average.



Exercise Prescription:

Mode: Walking on Track and Upright Bike

Frequency: 3 days/week (MWF). Adding exercise on days off (T/TH) after the  

first two weeks

Duration: Starting with total of 20 minutes and progressing to 40  

minutes/session

Intensity: -150bpm, MET level <3.0 until duration goals are  achieved. 

Education: Education will be focused towards the importance of staying  active 

on a daily basis and incorporating leisure time activity at home. 



Exercise Reassessment (Date: _________):

Exercise Discharge/Follow-Up (Date: _________):



Nutrition Assessment: Patient is diagnosed with T2D and has recently been  

educated by a dietician on how to improve her diet to better control her  blood 

glucose levels. Patients  does the majority of the cooking  and they now

appear to have a well-grounded understanding of how to count  carbohydrates and 

which foods they need to be mindful of (simple carbs,  sugars) and which they do

not need to stress over (proteins, healthy  fats). Patient is aware that while 

she is taking warfarin that she cannot  be eating dark leafy vegetables. 



Nutrition Plan:

Goals: Goals will be to help assist the patient in modifying her diet to  better

adhere to diabetic friendly nutritional guidelines. 

Intervention: Intervention will include helping the patient learn to read  

nutrition labels and interpret proper information from them and start to  modify

small portions of her diet to help achieve high portions of  vegetables, fruits,

whole grains, and healthy fats. 

Education: Education will focus on different nutrients and vitamins, fiber  

content, and sugar vs. artificial sweetener. 



Nutrition Reassessment (Date: _________):

Nutrition Discharge/Follow-Up (Date: _________):



Psychosocial Assessment: According to the Hospital Anxiety and Depression  Scale

(HADS) assessment, the patient scores within normal ranges (6/21)  for 

depression and (1/21) for anxiety. She is a very reserved person and  not very 

forth coming with information. She does express she has felt more  stressed than

normal since her cardiac event and feels as if something bad  is going to happen

to her. 



Psychosocial Plan:

Goals: Our primary goal will be to help build confidence in the  patientboth in

her ability to do the things she loves to do as well as  build security that she

knows how to monitor signs and symptoms and stress  levels in order to help 

prevent future cardiac events from happening.  Intervention: We will help foster

a safe education and exercise  environment focused on positive reinforcement and

motivation. We will  touch base with the patient frequently in order to help 

keep her motivated  and provide positive feedback such that she may witness the 

progress she  has been making. 

Education: Education will focus on the importance of social and mental  health 

to the same degree that we value physical health. 



Psychosocial Reassessment (Date: _________):

Psychosocial Discharge/Follow-Up (Date: _________):





Physician Signature: ________________________________Date: _________

MTDD

## 2019-03-06 VITALS — SYSTOLIC BLOOD PRESSURE: 128 MMHG | DIASTOLIC BLOOD PRESSURE: 74 MMHG

## 2019-03-09 VITALS — SYSTOLIC BLOOD PRESSURE: 136 MMHG | DIASTOLIC BLOOD PRESSURE: 82 MMHG

## 2019-03-09 VITALS — DIASTOLIC BLOOD PRESSURE: 78 MMHG | SYSTOLIC BLOOD PRESSURE: 122 MMHG

## 2019-03-11 VITALS — DIASTOLIC BLOOD PRESSURE: 78 MMHG | SYSTOLIC BLOOD PRESSURE: 142 MMHG

## 2019-03-11 VITALS — DIASTOLIC BLOOD PRESSURE: 82 MMHG | SYSTOLIC BLOOD PRESSURE: 126 MMHG

## 2019-03-12 ENCOUNTER — HOSPITAL ENCOUNTER (OUTPATIENT)
Dept: HOSPITAL 89 - CT | Age: 54
End: 2019-03-12
Attending: INTERNAL MEDICINE
Payer: COMMERCIAL

## 2019-03-12 VITALS — BODY MASS INDEX: 28.79 KG/M2

## 2019-03-12 DIAGNOSIS — E27.9: Primary | ICD-10-CM

## 2019-03-12 PROCEDURE — 74178 CT ABD&PLV WO CNTR FLWD CNTR: CPT

## 2019-03-12 NOTE — RADIOLOGY IMAGING REPORT
FACILITY: [facility] [place] 

 

PATIENT NAME: Yokasta Dowd

: 1965

MR: [id number]

V: [id number]

EXAM DATE: 

ORDERING PHYSICIAN: JEN SAMPSON

TECHNOLOGIST: 

 

[place] [facility] [place]

Patient: Yokasta Dowd

: 1965

MRN: [id number]

Visit/Account:[id number]

Date of Sevice:  3/12/2019

 

ACCESSION #: [id number]

 

CT ABDOMEN PELVIS W & W/O CONTRAST

 

HISTORY:  Left-sided adrenal mass

 

TECHNIQUE: Axial images acquired through the abdomen/pelvis both with and without IV contrast..  Ángel
nal and sagittal reformatting also performed.Dose Lowering Technique

 

One of the following dose optimization techniques was utilized in the performance of this exam: Autom
ated exposure control; adjustment of the mA and/or kV according to the patient's size; or use of an i
terative  reconstruction technique.  Specific details can be referenced in the facility's radiology C
T exam operational policy.

 

CONTRAST:  75 mL Isovue-370

 

COMPARISON:  2019

 

FINDINGS:

 

Visualized lung bases:  Further partial improvement of the atelectasis in the lung bases

 

Hepatobiliary:  Postsurgical changes from a cholecystectomy

 

Spleen:  Negative.

 

Adrenals:  Right adrenal gland appears unremarkable.  The left adrenal masslike area is decreased in 
size now measuring 2.2 cm x 1.5 cm as opposed to 2.7 x 2.5 cm previously..  The CT Hounsfield units p
rior to contrast demonstration is 10 and measured 13 in the venous phase and 14 in the delayed phase.
  These findings combined with the prior CT findings are likely related to a resolving adrenal hemorr
frances.

 

Pancreas:  Negative.

 

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 
 Previously noted bladder wall thickening is mostly improved

 

Genitalia:  Hysterectomy

 

GI:  There is a small hiatal hernia.  There are surgical clips adjacent to the cecum

 

Vessels/spaces/nodes:  Vena cava umbrella appears unchanged in position.  Previously noted right femo
ral catheter is no longer seen.  There are areas of decreased enhancement identified in the inferior 
vena cava and in the common internal and external femoral veins consistent with extensive venous thro
mbosis.  The upper pole a lobular hypoattenuating process in the left inguinal region within the left
 pectineus muscle appear decreased in size and could representing resolving hematomas

 

Bones/soft tissues:  Left inferior breast tissue is identified on the current examination although th
ere are several nodular densities seen in the inferior left breast for which mammography correlation 
is recommended

 

Again noted are postsurgical changes from a ventral hernia repair.  Fluid and small amount of air in 
the subcutaneous soft tissues just anterior to the repair is slightly decreased in size.

 

There are mild spondylotic changes in the thoracolumbar spine

 

Additional findings:  None pertinent.

 

IMPRESSION:

 

Left adrenal mass is getting smaller and is likely related to resolving adrenal hemorrhage

 

Further improvement of the bibasilar lung atelectasis

 

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

 

Nodular density seen in the inferior left breast for which mammography is recommended post surgical c
hanges from ventral hernia repair with decreasing amount of fluid and air in the subcutaneous soft ti
ssues

 

Results were called to JEN SAMPSON at 3/12/2019 11:43 AM.

 

Report Dictated By: Karmen Figueroa MD at 3/12/2019 11:21 AM

 

Report E-Signed By: Karmen Figueroa MD  at 3/12/2019 11:48 AM

 

EMELYN:KRIS

## 2019-03-15 VITALS — SYSTOLIC BLOOD PRESSURE: 122 MMHG | DIASTOLIC BLOOD PRESSURE: 76 MMHG

## 2019-03-15 VITALS — SYSTOLIC BLOOD PRESSURE: 106 MMHG | DIASTOLIC BLOOD PRESSURE: 68 MMHG

## 2019-03-18 ENCOUNTER — HOSPITAL ENCOUNTER (OUTPATIENT)
Dept: HOSPITAL 89 - LAB | Age: 54
End: 2019-03-18
Attending: INTERNAL MEDICINE
Payer: COMMERCIAL

## 2019-03-18 VITALS — DIASTOLIC BLOOD PRESSURE: 70 MMHG | SYSTOLIC BLOOD PRESSURE: 102 MMHG

## 2019-03-18 VITALS — BODY MASS INDEX: 28.79 KG/M2

## 2019-03-18 DIAGNOSIS — Z79.4: ICD-10-CM

## 2019-03-18 DIAGNOSIS — I10: ICD-10-CM

## 2019-03-18 DIAGNOSIS — E11.9: Primary | ICD-10-CM

## 2019-03-18 PROCEDURE — 82435 ASSAY OF BLOOD CHLORIDE: CPT

## 2019-03-18 PROCEDURE — 82374 ASSAY BLOOD CARBON DIOXIDE: CPT

## 2019-03-18 PROCEDURE — 84295 ASSAY OF SERUM SODIUM: CPT

## 2019-03-18 PROCEDURE — 84132 ASSAY OF SERUM POTASSIUM: CPT

## 2019-03-18 PROCEDURE — 36415 COLL VENOUS BLD VENIPUNCTURE: CPT

## 2019-03-18 PROCEDURE — 82947 ASSAY GLUCOSE BLOOD QUANT: CPT

## 2019-03-18 PROCEDURE — 82565 ASSAY OF CREATININE: CPT

## 2019-03-18 PROCEDURE — 84520 ASSAY OF UREA NITROGEN: CPT

## 2019-03-18 PROCEDURE — 82310 ASSAY OF CALCIUM: CPT

## 2019-03-18 PROCEDURE — 83036 HEMOGLOBIN GLYCOSYLATED A1C: CPT

## 2019-03-20 VITALS — SYSTOLIC BLOOD PRESSURE: 94 MMHG | DIASTOLIC BLOOD PRESSURE: 68 MMHG

## 2019-03-20 VITALS — SYSTOLIC BLOOD PRESSURE: 106 MMHG | DIASTOLIC BLOOD PRESSURE: 74 MMHG

## 2019-03-22 VITALS — SYSTOLIC BLOOD PRESSURE: 90 MMHG | DIASTOLIC BLOOD PRESSURE: 62 MMHG

## 2019-03-22 VITALS — SYSTOLIC BLOOD PRESSURE: 92 MMHG | DIASTOLIC BLOOD PRESSURE: 68 MMHG

## 2019-03-29 ENCOUNTER — HOSPITAL ENCOUNTER (OUTPATIENT)
Dept: HOSPITAL 89 - MAMO | Age: 54
End: 2019-03-29
Attending: INTERNAL MEDICINE
Payer: COMMERCIAL

## 2019-03-29 VITALS — BODY MASS INDEX: 28.79 KG/M2

## 2019-03-29 DIAGNOSIS — R19.7: Primary | ICD-10-CM

## 2019-03-29 PROCEDURE — 87177 OVA AND PARASITES SMEARS: CPT

## 2019-03-29 PROCEDURE — 87045 FECES CULTURE AEROBIC BACT: CPT

## 2019-04-01 VITALS — SYSTOLIC BLOOD PRESSURE: 104 MMHG | DIASTOLIC BLOOD PRESSURE: 60 MMHG

## 2019-04-01 NOTE — CARDIAC REHAB PLAN OF CARE
Physician: Nory Herndon MD

Patient is being seen: Martha Jennings MS

Medical Diagnosis: MI; Cardiac Arrest

Date of Onset: 01/26/19                                                      

Date of Initial Evaluation: 03/05/19

Date patient was last seen: 04/01/19

Number of treatments: 9                                                  Number 

of cancellations/No Shows: 3



INTERVENTIONS:



Due Date: 05/01/19



Exercise Reassessment (Date: 04/01/19): The patient has recently returned  to 

work which has taken a toll on her energy levels. She works from about  3am-

1:30pm and then comes to CR right after getting off work. Despite this  minor 

challenge she has immensely improved in her physical capabilities.  She started 

with 10 minutes of exercise before fatiguing, and now does 35  minutes/session 

on average. She has been able to achieve her THR range,  frequenting HR between 

120 to 130bpm. She has also improved her intensity,  increasing her resistance 

on the bike to level 4. Her motivation to  exercise has improved as she reports 

it does help her feel better in the  long run. She recently asked to receive 

some lower body exercises to do at  home, which we provided her with, in order 

to strengthen her lower body. 



Over the next month we will set the following goals:

Duration: 40+ minutes on average/session

THR: 120-150bpm 

Intensity: achieve Level 5 on the bike with METs of 4.5+



Nutrition Reassessment (Date: 04/01/19): The patient reports that her  diabetic 

nutrition have been going well. They are improving at reading  food labels and 

have increased their intake of fresh fruits/vegetables. At  her most recent 

weigh in the patient weighs 172lbs (down 20lbs) since her  initial eval. This is

due to her loss of fluid/edema in her lower  extremities. This loss of edema has

helped the patient feel immensely  better. 



Psychosocial Reassessment (Date: 04/01/19): Despite her lower levels of  energy,

the patient remains highly motivated to exercise and stay active.  She is 

motivated to continue learning and has been interested in applying  the 

information he is learning to her daily life at home. She has become  much more 

relaxed and comfortable at rehab and has become more social as  well. We hope to

continue on this upward progression by continuing to  foster a safe and positive

exercise environment and provide her with  supplemental support and information 

when it is requested. 



DAYL

## 2019-04-03 VITALS — SYSTOLIC BLOOD PRESSURE: 118 MMHG | DIASTOLIC BLOOD PRESSURE: 74 MMHG

## 2019-04-08 VITALS — SYSTOLIC BLOOD PRESSURE: 92 MMHG | DIASTOLIC BLOOD PRESSURE: 70 MMHG

## 2019-04-08 VITALS — DIASTOLIC BLOOD PRESSURE: 74 MMHG | SYSTOLIC BLOOD PRESSURE: 108 MMHG

## 2019-04-12 VITALS — DIASTOLIC BLOOD PRESSURE: 76 MMHG | SYSTOLIC BLOOD PRESSURE: 100 MMHG

## 2019-04-12 VITALS — DIASTOLIC BLOOD PRESSURE: 64 MMHG | SYSTOLIC BLOOD PRESSURE: 104 MMHG

## 2019-04-15 ENCOUNTER — HOSPITAL ENCOUNTER (OUTPATIENT)
Dept: HOSPITAL 89 - CARD | Age: 54
Discharge: HOME | End: 2019-04-15
Attending: PHYSICAL MEDICINE & REHABILITATION
Payer: COMMERCIAL

## 2019-04-15 VITALS — SYSTOLIC BLOOD PRESSURE: 124 MMHG | DIASTOLIC BLOOD PRESSURE: 78 MMHG

## 2019-04-15 VITALS — DIASTOLIC BLOOD PRESSURE: 64 MMHG | SYSTOLIC BLOOD PRESSURE: 102 MMHG

## 2019-04-15 VITALS — BODY MASS INDEX: 28.79 KG/M2

## 2019-04-15 DIAGNOSIS — I25.2: Primary | ICD-10-CM

## 2019-04-15 DIAGNOSIS — J96.02: ICD-10-CM

## 2019-04-15 DIAGNOSIS — J96.01: ICD-10-CM

## 2019-04-15 PROCEDURE — 93798 PHYS/QHP OP CAR RHAB W/ECG: CPT

## 2019-04-28 ENCOUNTER — HOSPITAL ENCOUNTER (EMERGENCY)
Dept: HOSPITAL 89 - ER | Age: 54
Discharge: HOME | End: 2019-04-28
Payer: COMMERCIAL

## 2019-04-28 VITALS — WEIGHT: 150 LBS | BODY MASS INDEX: 28.79 KG/M2

## 2019-04-28 VITALS — DIASTOLIC BLOOD PRESSURE: 76 MMHG | SYSTOLIC BLOOD PRESSURE: 126 MMHG

## 2019-04-28 DIAGNOSIS — T22.112A: ICD-10-CM

## 2019-04-28 DIAGNOSIS — T22.211A: Primary | ICD-10-CM
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
## 2019-04-28 PROCEDURE — 90715 TDAP VACCINE 7 YRS/> IM: CPT

## 2019-04-28 PROCEDURE — 90471 IMMUNIZATION ADMIN: CPT

## 2019-04-28 PROCEDURE — 99283 EMERGENCY DEPT VISIT LOW MDM: CPT
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
## 2019-05-15 VITALS — DIASTOLIC BLOOD PRESSURE: 76 MMHG | SYSTOLIC BLOOD PRESSURE: 102 MMHG

## 2019-05-15 VITALS — SYSTOLIC BLOOD PRESSURE: 104 MMHG | DIASTOLIC BLOOD PRESSURE: 60 MMHG

## 2019-05-17 NOTE — CARDIAC REHAB PLAN OF CARE
Physician: Nory Herndon MD

Patient is being seen:Martha Jennings MS

Medical Diagnosis: MI; Cardiac Arret

Date of Onset:  01/26/19                                                        

Date of Initial Evaluation: 03/05/19



INTERVENTIONS:



Due Date: 05/17/19



Exercise Discharge/Follow-Up (Date: 05/17/19): Patient has competed 15 of  her 

offered 36 visits. She reports being too tired after working 10hrs on  her feet 

all day to come here afterwards, which we acknowledge and respect  her decision.

Upon her discharge evaluation, it is evident that the  patient has learned the 

importance of exercise, particularly leisure time  activity at home outside of 

work. She has also learned the importance of  monitoring her blood sugar levels 

regularly and after exercise to avoid  hypoglycemia associated with activity. 

During her time in [facility], she was able  to increase her exercise tolerance and 

intensity. By the end she was  exercising for 40 minutes at 5.6 METs on average.



She improved her 6-minute walk test by 275ft, demonstrating approximately  a 15%

increase. The patient demonstrated appropriate HR and BP response  with 

exercise, and demonstrates the knowledge needed to leave the program  and be 

able to monitor her signs/symptoms with exertion. Patient  understands that we 

are a resource that she can always contact if she has  questions or concerns in 

the future. 



Nutrition Discharge/Follow-Up (Date: 05/17/19): The patient and her   has

adapted qualities of a heart healthy and diabetes friendly  diet that have 

helped them lose weight. Between initial and discharge  weight the patient has 

lost approximately 15lbs (going from 192lbs to  177lbs). Much of which was due 

to swelling post-surgery, but could also be  explained by the additional 

activity and healthier eating habits. She  leaves the program with heart healthy

nutrition guides, recipes, and  information as well as a regular dietitian 

assigned to her health care  team. 



Psychosocial Discharge/Follow-Up (Date: 05/17/19): Upon entering the  program, 

the patient expressed little emotion, was very reserved, and  stated she was 

highly stressed and prone to anxiety. This was reflected by  slightly elevated 

depression levels according to her HADS assessment. Her  depression scored 

decreased upon discharge going from 6/21 to 2/21. This  does reflect the 

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 Over the 

course of her program the patient became much more social,  positive, and 

engaged with her classmates and our staff. She has a strong  support system at 

home and she understand that she can come visit or call  us anytime she has 

questions or concerns. 



DALY

## 2019-06-18 ENCOUNTER — HOSPITAL ENCOUNTER (OUTPATIENT)
Dept: HOSPITAL 89 - LAB | Age: 54
End: 2019-06-18
Attending: INTERNAL MEDICINE
Payer: COMMERCIAL

## 2019-06-18 VITALS — BODY MASS INDEX: 28.79 KG/M2

## 2019-06-18 DIAGNOSIS — Z86.711: ICD-10-CM

## 2019-06-18 DIAGNOSIS — E11.65: Primary | ICD-10-CM

## 2019-06-18 DIAGNOSIS — R20.8: ICD-10-CM

## 2019-06-18 DIAGNOSIS — I10: ICD-10-CM

## 2019-06-18 LAB — PLATELET COUNT, AUTOMATED: 271 K/UL (ref 150–450)

## 2019-06-18 PROCEDURE — 82374 ASSAY BLOOD CARBON DIOXIDE: CPT

## 2019-06-18 PROCEDURE — 82247 BILIRUBIN TOTAL: CPT

## 2019-06-18 PROCEDURE — 82565 ASSAY OF CREATININE: CPT

## 2019-06-18 PROCEDURE — 82728 ASSAY OF FERRITIN: CPT

## 2019-06-18 PROCEDURE — 82310 ASSAY OF CALCIUM: CPT

## 2019-06-18 PROCEDURE — 36415 COLL VENOUS BLD VENIPUNCTURE: CPT

## 2019-06-18 PROCEDURE — 85025 COMPLETE CBC W/AUTO DIFF WBC: CPT

## 2019-06-18 PROCEDURE — 83550 IRON BINDING TEST: CPT

## 2019-06-18 PROCEDURE — 83540 ASSAY OF IRON: CPT

## 2019-06-18 PROCEDURE — 84460 ALANINE AMINO (ALT) (SGPT): CPT

## 2019-06-18 PROCEDURE — 84155 ASSAY OF PROTEIN SERUM: CPT

## 2019-06-18 PROCEDURE — 82947 ASSAY GLUCOSE BLOOD QUANT: CPT

## 2019-06-18 PROCEDURE — 84443 ASSAY THYROID STIM HORMONE: CPT

## 2019-06-18 PROCEDURE — 84295 ASSAY OF SERUM SODIUM: CPT

## 2019-06-18 PROCEDURE — 82040 ASSAY OF SERUM ALBUMIN: CPT

## 2019-06-18 PROCEDURE — 85379 FIBRIN DEGRADATION QUANT: CPT

## 2019-06-18 PROCEDURE — 84450 TRANSFERASE (AST) (SGOT): CPT

## 2019-06-18 PROCEDURE — 83036 HEMOGLOBIN GLYCOSYLATED A1C: CPT

## 2019-06-18 PROCEDURE — 84075 ASSAY ALKALINE PHOSPHATASE: CPT

## 2019-06-18 PROCEDURE — 82435 ASSAY OF BLOOD CHLORIDE: CPT

## 2019-06-18 PROCEDURE — 84132 ASSAY OF SERUM POTASSIUM: CPT

## 2019-06-18 PROCEDURE — 84520 ASSAY OF UREA NITROGEN: CPT

## 2019-06-18 PROCEDURE — 82607 VITAMIN B-12: CPT

## 2019-07-03 ENCOUNTER — HOSPITAL ENCOUNTER (OUTPATIENT)
Dept: HOSPITAL 89 - LAB | Age: 54
End: 2019-07-03
Attending: INTERNAL MEDICINE
Payer: COMMERCIAL

## 2019-07-03 VITALS — BODY MASS INDEX: 28.79 KG/M2

## 2019-07-03 DIAGNOSIS — E11.9: Primary | ICD-10-CM

## 2019-07-03 PROCEDURE — 84520 ASSAY OF UREA NITROGEN: CPT

## 2019-07-03 PROCEDURE — 84295 ASSAY OF SERUM SODIUM: CPT

## 2019-07-03 PROCEDURE — 84132 ASSAY OF SERUM POTASSIUM: CPT

## 2019-07-03 PROCEDURE — 82435 ASSAY OF BLOOD CHLORIDE: CPT

## 2019-07-03 PROCEDURE — 82565 ASSAY OF CREATININE: CPT

## 2019-07-03 PROCEDURE — 82947 ASSAY GLUCOSE BLOOD QUANT: CPT

## 2019-07-03 PROCEDURE — 36415 COLL VENOUS BLD VENIPUNCTURE: CPT

## 2019-07-03 PROCEDURE — 82310 ASSAY OF CALCIUM: CPT

## 2019-07-03 PROCEDURE — 82374 ASSAY BLOOD CARBON DIOXIDE: CPT

## 2019-07-17 NOTE — SHORT(OUTPT) DISCHARGE SUMMARY
Discharge Summary


Reason for Hosp/Final Diag:  


(1) Gall stones


Status:  Chronic


Hospital Course & Plan:  12/22/17:  POD#1 s/p robotic right oophorectomy, 

appendectomy, cholecystectomy, lysis of adhesions, and ventral hernia repair 

without mesh.  Doing well.  Will d/c to home.





(2) Cyst of ovary


Status:  Chronic


(3) Hernia of abdominal wall


Status:  Chronic


Departure


Discharge to:  Home, Self Care





Discharge Instructions


Home Meds


Active Scripts


Oxycodone/Acetaminophen (OXYCODONE/ACETAMINOPHEN 5MG/325 MG) 5 Mg/325 Mg Tab, 1-

2 TAB PO Q4H Y for MODERATE PAIN, #30 TAB 0 Refills


   Prov:ULLRICH,JOHN A MD         12/22/17


Docusate Sodium (DOCUSATE SODIUM) 100 Mg Capsule, 1 CAP PO BID, #30 CAPSULE 0 

Refills


   Prov:ULLRICH,JOHN A MD         12/22/17


Insulin Glargine,Hum.rec.anlog (LANTUS SOLOSTAR) 100 Unit/1 Ml Insuln.pen, 

60.55 UNIT SQ BID, #2 BOX 11 Refills


   Prov:JEN SAMPSON MD         12/12/17


Esomeprazole Magnesium (NEXIUM) 40 Mg Capsule.dr, 1 CAP PO QDAY, #90 CAP 3 

Refills


   Prov:JEN SAMPSON MD         12/12/17


Metformin Hcl (METFORMIN HCL) 1,000 Mg Tablet, 1 TAB PO BID, #180 TAB 3 Refills


   Prov:JEN SAMPSON MD         12/12/17


Lisinopril (LISINOPRIL) 20 Mg Tablet, 1 TAB PO QDAY, #90 TAB 3 Refills


   Prov:JEN SAMPSON MD         12/12/17


Needles, Insulin Disposable (PEN NEEDLES) 1 Each Dis.needle, BOX MC, #2 4 

Refills


   Prov:JEN SAMPSON MD         12/12/17


Hydrocodone Bit/Acetaminophen (HYDROCODON-ACETAMINOPHEN 5-325) 1 Each Tablet, 1 

EACH PO Q4-6H Y for PAIN, #12 TAB


   Prov:SABAS BLUMP         12/7/17


Hydrochlorothiazide (HYDROCHLOROTHIAZIDE) 12.5 Mg Tablet, 1 TAB PO QDAY, #90 

TAB 4 Refills


   Prov:JEN SAMPSON MD         11/30/17


Rosuvastatin Calcium (CRESTOR) 40 Mg Tablet, 1 TAB PO QDAY, #90 TAB 0 Refills


   Prov:JEN SAMPSON MD         11/15/17


Exenatide Microspheres (Bydureon Pen) 2 Mg/0.65 Ml Pen.injctr, 2 MG SUBQ AS 

DIRECTED, #4 EACH 2 Refills


   Prov:JEN SAMPSON MD         11/15/17


Insulin Glulisine (APIDRA) 100 Unit/1 Ml Vial, 10.28 UNIT SQ TID, #3 VIAL 3 

Refills


   Take 10 before bfast and lunch and 28 before dinner


   Prov:JEN SAMPSON MD         7/13/17


Reported Medications


Cholecalciferol (Vitamin D3) (VITAMIN D-3) 2,000 Unit Capsule, 2000 UNIT PO QDAY

, CAPSULE


   3/14/17


Aspirin (Aspirin) 81 Mg Tablet., 1 TAB PO QDAY, 0 Refills


   2/4/12


Follow up Referrals:  


General Surgery - 01/10/18 @ Surgery, [facility] with Ullrich,John A Md You have a 

follow up appointment scheduled with Dr. Ullrich on Wednesday, 1/10/18, at 1:45.





Diet:  Regular


Activity:  As Tolerated


Special Instructions:  


You can remove the white surgical dressings on Saturday, 12/23/17, then


you can shower.  After showering, leave the incisions open to air but


leave the steristrips in place until they fall off on their own.  Do not


immerse the incisions for 2 weeks.





Problem Qualifiers





(1) Cyst of ovary:  


Laterality:  right  Qualified Codes:  N83.201 - Unspecified ovarian cyst, right 

side








ULLRICH,JOHN A MD Dec 22, 2017 08:45 [place]

## 2019-07-22 ENCOUNTER — HOSPITAL ENCOUNTER (OUTPATIENT)
Dept: HOSPITAL 89 - LAB | Age: 54
End: 2019-07-22
Attending: INTERNAL MEDICINE
Payer: COMMERCIAL

## 2019-07-22 VITALS — BODY MASS INDEX: 28.79 KG/M2

## 2019-07-22 DIAGNOSIS — R79.89: Primary | ICD-10-CM

## 2019-07-22 PROCEDURE — 84445 ASSAY OF TSI GLOBULIN: CPT

## 2019-07-22 PROCEDURE — 84443 ASSAY THYROID STIM HORMONE: CPT

## 2019-07-22 PROCEDURE — 36415 COLL VENOUS BLD VENIPUNCTURE: CPT

## 2019-07-22 PROCEDURE — 84481 FREE ASSAY (FT-3): CPT

## 2019-07-22 PROCEDURE — 84439 ASSAY OF FREE THYROXINE: CPT
